# Patient Record
Sex: MALE | Race: WHITE | Employment: FULL TIME
[De-identification: names, ages, dates, MRNs, and addresses within clinical notes are randomized per-mention and may not be internally consistent; named-entity substitution may affect disease eponyms.]

---

## 2017-04-14 PROBLEM — R00.2 PALPITATIONS: Status: ACTIVE | Noted: 2017-04-14

## 2017-06-20 PROCEDURE — 81003 URINALYSIS AUTO W/O SCOPE: CPT | Performed by: INTERNAL MEDICINE

## 2017-06-20 PROCEDURE — 82570 ASSAY OF URINE CREATININE: CPT | Performed by: INTERNAL MEDICINE

## 2017-06-20 PROCEDURE — 82043 UR ALBUMIN QUANTITATIVE: CPT | Performed by: INTERNAL MEDICINE

## 2017-08-24 PROBLEM — I51.9 ASYMPTOMATIC LV DYSFUNCTION: Status: ACTIVE | Noted: 2017-08-24

## 2018-01-18 PROBLEM — H25.813 COMBINED FORMS OF AGE-RELATED CATARACT OF BOTH EYES: Status: ACTIVE | Noted: 2018-01-18

## 2018-01-18 PROBLEM — H43.812 PVD (POSTERIOR VITREOUS DETACHMENT), LEFT: Status: ACTIVE | Noted: 2018-01-18

## 2018-01-18 PROBLEM — E11.9 DIABETES MELLITUS TYPE 2 WITHOUT RETINOPATHY (HCC): Status: ACTIVE | Noted: 2018-01-18

## 2018-03-27 PROCEDURE — 82570 ASSAY OF URINE CREATININE: CPT | Performed by: INTERNAL MEDICINE

## 2018-03-27 PROCEDURE — 82043 UR ALBUMIN QUANTITATIVE: CPT | Performed by: INTERNAL MEDICINE

## 2018-03-27 PROCEDURE — 82607 VITAMIN B-12: CPT | Performed by: INTERNAL MEDICINE

## 2018-03-27 PROCEDURE — 81001 URINALYSIS AUTO W/SCOPE: CPT | Performed by: INTERNAL MEDICINE

## 2018-04-11 PROCEDURE — 88305 TISSUE EXAM BY PATHOLOGIST: CPT | Performed by: INTERNAL MEDICINE

## 2018-04-22 PROBLEM — J33.9 NASAL POLYPS: Status: ACTIVE | Noted: 2018-04-22

## 2018-06-18 PROCEDURE — 82607 VITAMIN B-12: CPT | Performed by: INTERNAL MEDICINE

## 2018-06-28 ENCOUNTER — SURGERY (OUTPATIENT)
Age: 58
End: 2018-06-28

## 2018-06-28 ENCOUNTER — ANESTHESIA EVENT (OUTPATIENT)
Dept: SURGERY | Facility: HOSPITAL | Age: 58
End: 2018-06-28
Payer: COMMERCIAL

## 2018-06-28 ENCOUNTER — ANESTHESIA (OUTPATIENT)
Dept: SURGERY | Facility: HOSPITAL | Age: 58
End: 2018-06-28
Payer: COMMERCIAL

## 2018-06-28 ENCOUNTER — HOSPITAL ENCOUNTER (OUTPATIENT)
Facility: HOSPITAL | Age: 58
Setting detail: HOSPITAL OUTPATIENT SURGERY
Discharge: HOME | End: 2018-06-28
Attending: OTOLARYNGOLOGY | Admitting: OTOLARYNGOLOGY
Payer: COMMERCIAL

## 2018-06-28 VITALS
WEIGHT: 216.81 LBS | DIASTOLIC BLOOD PRESSURE: 83 MMHG | OXYGEN SATURATION: 100 % | RESPIRATION RATE: 20 BRPM | HEIGHT: 73 IN | BODY MASS INDEX: 28.73 KG/M2 | HEART RATE: 76 BPM | SYSTOLIC BLOOD PRESSURE: 123 MMHG | TEMPERATURE: 98 F

## 2018-06-28 DIAGNOSIS — J34.3 HYPERTROPHY OF NASAL TURBINATES: ICD-10-CM

## 2018-06-28 DIAGNOSIS — J32.4 CHRONIC PANSINUSITIS: ICD-10-CM

## 2018-06-28 DIAGNOSIS — J33.9 NOSE POLYP: ICD-10-CM

## 2018-06-28 LAB
GLUCOSE BLD-MCNC: 124 MG/DL (ref 65–99)
GLUCOSE BLD-MCNC: 154 MG/DL (ref 65–99)

## 2018-06-28 PROCEDURE — 099T8ZZ DRAINAGE OF LEFT FRONTAL SINUS, VIA NATURAL OR ARTIFICIAL OPENING ENDOSCOPIC: ICD-10-PCS | Performed by: OTOLARYNGOLOGY

## 2018-06-28 PROCEDURE — 09BR8ZZ EXCISION OF LEFT MAXILLARY SINUS, VIA NATURAL OR ARTIFICIAL OPENING ENDOSCOPIC: ICD-10-PCS | Performed by: OTOLARYNGOLOGY

## 2018-06-28 PROCEDURE — 09TU8ZZ RESECTION OF RIGHT ETHMOID SINUS, VIA NATURAL OR ARTIFICIAL OPENING ENDOSCOPIC: ICD-10-PCS | Performed by: OTOLARYNGOLOGY

## 2018-06-28 PROCEDURE — 88311 DECALCIFY TISSUE: CPT | Performed by: OTOLARYNGOLOGY

## 2018-06-28 PROCEDURE — 09BQ8ZZ EXCISION OF RIGHT MAXILLARY SINUS, VIA NATURAL OR ARTIFICIAL OPENING ENDOSCOPIC: ICD-10-PCS | Performed by: OTOLARYNGOLOGY

## 2018-06-28 PROCEDURE — 8E09XBZ COMPUTER ASSISTED PROCEDURE OF HEAD AND NECK REGION: ICD-10-PCS | Performed by: OTOLARYNGOLOGY

## 2018-06-28 PROCEDURE — 09TV8ZZ RESECTION OF LEFT ETHMOID SINUS, VIA NATURAL OR ARTIFICIAL OPENING ENDOSCOPIC: ICD-10-PCS | Performed by: OTOLARYNGOLOGY

## 2018-06-28 PROCEDURE — 09CW8ZZ EXTIRPATION OF MATTER FROM RIGHT SPHENOID SINUS, VIA NATURAL OR ARTIFICIAL OPENING ENDOSCOPIC: ICD-10-PCS | Performed by: OTOLARYNGOLOGY

## 2018-06-28 PROCEDURE — 82962 GLUCOSE BLOOD TEST: CPT

## 2018-06-28 PROCEDURE — 88304 TISSUE EXAM BY PATHOLOGIST: CPT | Performed by: OTOLARYNGOLOGY

## 2018-06-28 PROCEDURE — 09CX8ZZ EXTIRPATION OF MATTER FROM LEFT SPHENOID SINUS, VIA NATURAL OR ARTIFICIAL OPENING ENDOSCOPIC: ICD-10-PCS | Performed by: OTOLARYNGOLOGY

## 2018-06-28 PROCEDURE — 09BM0ZZ EXCISION OF NASAL SEPTUM, OPEN APPROACH: ICD-10-PCS | Performed by: OTOLARYNGOLOGY

## 2018-06-28 PROCEDURE — 099S8ZZ DRAINAGE OF RIGHT FRONTAL SINUS, VIA NATURAL OR ARTIFICIAL OPENING ENDOSCOPIC: ICD-10-PCS | Performed by: OTOLARYNGOLOGY

## 2018-06-28 RX ORDER — HYDROCODONE BITARTRATE AND ACETAMINOPHEN 5; 325 MG/1; MG/1
1 TABLET ORAL EVERY 4 HOURS PRN
Qty: 30 TABLET | Refills: 0 | Status: SHIPPED | OUTPATIENT
Start: 2018-06-28 | End: 2018-07-08

## 2018-06-28 RX ORDER — HYDROCODONE BITARTRATE AND ACETAMINOPHEN 5; 325 MG/1; MG/1
1 TABLET ORAL AS NEEDED
Status: COMPLETED | OUTPATIENT
Start: 2018-06-28 | End: 2018-06-28

## 2018-06-28 RX ORDER — SODIUM CHLORIDE, SODIUM LACTATE, POTASSIUM CHLORIDE, CALCIUM CHLORIDE 600; 310; 30; 20 MG/100ML; MG/100ML; MG/100ML; MG/100ML
INJECTION, SOLUTION INTRAVENOUS CONTINUOUS
Status: DISCONTINUED | OUTPATIENT
Start: 2018-06-28 | End: 2018-06-28

## 2018-06-28 RX ORDER — MEPERIDINE HYDROCHLORIDE 25 MG/ML
12.5 INJECTION INTRAMUSCULAR; INTRAVENOUS; SUBCUTANEOUS AS NEEDED
Status: DISCONTINUED | OUTPATIENT
Start: 2018-06-28 | End: 2018-06-28

## 2018-06-28 RX ORDER — LIDOCAINE HYDROCHLORIDE AND EPINEPHRINE 10; 10 MG/ML; UG/ML
INJECTION, SOLUTION INFILTRATION; PERINEURAL AS NEEDED
Status: DISCONTINUED | OUTPATIENT
Start: 2018-06-28 | End: 2018-06-28 | Stop reason: HOSPADM

## 2018-06-28 RX ORDER — DEXTROSE MONOHYDRATE 25 G/50ML
50 INJECTION, SOLUTION INTRAVENOUS
Status: DISCONTINUED | OUTPATIENT
Start: 2018-06-28 | End: 2018-06-28 | Stop reason: HOSPADM

## 2018-06-28 RX ORDER — CLINDAMYCIN PHOSPHATE 900 MG/50ML
900 INJECTION INTRAVENOUS ONCE
Status: DISCONTINUED | OUTPATIENT
Start: 2018-06-28 | End: 2018-06-28 | Stop reason: HOSPADM

## 2018-06-28 RX ORDER — HYDROCODONE BITARTRATE AND ACETAMINOPHEN 5; 325 MG/1; MG/1
TABLET ORAL
Status: DISCONTINUED
Start: 2018-06-28 | End: 2018-06-28

## 2018-06-28 RX ORDER — MIDAZOLAM HYDROCHLORIDE 1 MG/ML
1 INJECTION INTRAMUSCULAR; INTRAVENOUS EVERY 5 MIN PRN
Status: DISCONTINUED | OUTPATIENT
Start: 2018-06-28 | End: 2018-06-28

## 2018-06-28 RX ORDER — DEXTROSE MONOHYDRATE 25 G/50ML
50 INJECTION, SOLUTION INTRAVENOUS
Status: DISCONTINUED | OUTPATIENT
Start: 2018-06-28 | End: 2018-06-28

## 2018-06-28 RX ORDER — MORPHINE SULFATE 4 MG/ML
2 INJECTION, SOLUTION INTRAMUSCULAR; INTRAVENOUS EVERY 5 MIN PRN
Status: DISCONTINUED | OUTPATIENT
Start: 2018-06-28 | End: 2018-06-28

## 2018-06-28 RX ORDER — MORPHINE SULFATE 4 MG/ML
INJECTION, SOLUTION INTRAMUSCULAR; INTRAVENOUS
Status: COMPLETED
Start: 2018-06-28 | End: 2018-06-28

## 2018-06-28 RX ORDER — ONDANSETRON 2 MG/ML
4 INJECTION INTRAMUSCULAR; INTRAVENOUS AS NEEDED
Status: DISCONTINUED | OUTPATIENT
Start: 2018-06-28 | End: 2018-06-28

## 2018-06-28 RX ORDER — ACETAMINOPHEN 500 MG
1000 TABLET ORAL ONCE AS NEEDED
Status: DISCONTINUED | OUTPATIENT
Start: 2018-06-28 | End: 2018-06-28

## 2018-06-28 RX ORDER — CETIRIZINE HYDROCHLORIDE 5 MG/1
5 TABLET ORAL AS NEEDED
COMMUNITY
End: 2020-06-03

## 2018-06-28 RX ORDER — ACETAMINOPHEN 500 MG
1000 TABLET ORAL ONCE
Status: ON HOLD | COMMUNITY
End: 2018-06-28

## 2018-06-28 RX ORDER — NALOXONE HYDROCHLORIDE 0.4 MG/ML
80 INJECTION, SOLUTION INTRAMUSCULAR; INTRAVENOUS; SUBCUTANEOUS AS NEEDED
Status: DISCONTINUED | OUTPATIENT
Start: 2018-06-28 | End: 2018-06-28

## 2018-06-28 RX ORDER — HYDROCODONE BITARTRATE AND ACETAMINOPHEN 5; 325 MG/1; MG/1
2 TABLET ORAL AS NEEDED
Status: COMPLETED | OUTPATIENT
Start: 2018-06-28 | End: 2018-06-28

## 2018-06-28 NOTE — H&P
Pre-op Diagnosis: Hypertrophy of nasal turbinates [J34.3]  Chronic pansinusitis [J32.4]  Nose polyp [J33.9]    The H&P by Dr. Marco Torres dated 6/25 was reviewed by Jesse Vazquez MD on 6/28/2018, the patient was examined and no significant changes have occurred i

## 2018-06-28 NOTE — BRIEF OP NOTE
Pre-Operative Diagnosis: Hypertrophy of nasal turbinates [J34.3]  Chronic pansinusitis [J32.4]  Nose polyp [J33.9]     Post-Operative Diagnosis: Hypertrophy of nasal turbinates [J34. 3]Chronic pansinusitis [K88. 4]Nose polyp [J33.9]      Procedure Performed:

## 2018-06-28 NOTE — ANESTHESIA PREPROCEDURE EVALUATION
PRE-OP EVALUATION    Patient Name: Kateryna Mathews    Pre-op Diagnosis: Hypertrophy of nasal turbinates [J34.3]  Chronic pansinusitis [J32.4]  Nose polyp [J33.9]    Procedure(s):  BILATERAL ENDOSCOPIC FRONTAL SINUS EXPLORATION, BILATERAL ENDOSCOPIC TOTAL E Intravenous Continuous   fentaNYL citrate (SUBLIMAZE) 0.05 MG/ML injection 25 mcg 25 mcg Intravenous Q5 Min PRN   morphINE sulfate (PF) 4 MG/ML injection 2 mg 2 mg Intravenous Q5 Min PRN   Atropine Sulfate 0.1 MG/ML injection 0.5 mg 0.5 mg Intravenous PRN famoTIDine 20 MG Oral Tab Take 20 mg by mouth 2 (two) times daily. Disp:  Rfl:        Allergies: Ampicillin; Latex; Penicillins      Anesthesia Evaluation    Patient summary reviewed.     Anesthetic Complications           GI/Hepatic/Renal    Negative GI/ Lab Results  Component Value Date    06/18/2018   K 4.5 06/18/2018    06/18/2018   CO2 24.5 06/18/2018   BUN 12 06/18/2018   CREATSERUM 1.02 06/18/2018    (H) 06/18/2018            Airway      Mallampati: II  Mouth opening: 3 FB  TM

## 2018-06-28 NOTE — ANESTHESIA POSTPROCEDURE EVALUATION
[address] Patient Status:  Hospital Outpatient Surgery   Age/Gender 59year old male MRN [id number]   [facility] SURGERY Attending Sima Schirmer, MD   Hosp Day # 0 PCP Sima Grayson MD       Anesthesia Post-op Note

## 2018-07-09 PROCEDURE — 87086 URINE CULTURE/COLONY COUNT: CPT | Performed by: INTERNAL MEDICINE

## 2019-03-14 PROBLEM — E78.00 PURE HYPERCHOLESTEROLEMIA: Status: ACTIVE | Noted: 2019-03-14

## 2019-07-10 PROCEDURE — 81003 URINALYSIS AUTO W/O SCOPE: CPT | Performed by: INTERNAL MEDICINE

## 2020-04-27 ENCOUNTER — LAB ENCOUNTER (OUTPATIENT)
Dept: LAB | Age: 60
End: 2020-04-27
Attending: DERMATOLOGY
Payer: COMMERCIAL

## 2020-04-27 DIAGNOSIS — S46.811A STRAIN OF RIGHT TRAPEZIUS MUSCLE: Primary | ICD-10-CM

## 2020-04-27 PROCEDURE — 88305 TISSUE EXAM BY PATHOLOGIST: CPT

## 2020-04-30 NOTE — PROGRESS NOTES
Benign pathology results. Good news! No further treatment is necessary unless the lesion regrows or recurs. Ari Garcia MD

## 2020-07-23 ENCOUNTER — HOSPITAL ENCOUNTER (INPATIENT)
Facility: HOSPITAL | Age: 60
LOS: 4 days | Discharge: HOME | DRG: 247 | End: 2020-07-27
Attending: EMERGENCY MEDICINE | Admitting: INTERNAL MEDICINE
Payer: COMMERCIAL

## 2020-07-23 ENCOUNTER — APPOINTMENT (OUTPATIENT)
Dept: INTERVENTIONAL RADIOLOGY/VASCULAR | Facility: HOSPITAL | Age: 60
DRG: 247 | End: 2020-07-23
Attending: INTERNAL MEDICINE
Payer: COMMERCIAL

## 2020-07-23 ENCOUNTER — APPOINTMENT (OUTPATIENT)
Dept: CV DIAGNOSTICS | Facility: HOSPITAL | Age: 60
DRG: 247 | End: 2020-07-23
Attending: INTERNAL MEDICINE
Payer: COMMERCIAL

## 2020-07-23 ENCOUNTER — APPOINTMENT (OUTPATIENT)
Dept: GENERAL RADIOLOGY | Facility: HOSPITAL | Age: 60
DRG: 247 | End: 2020-07-23
Attending: EMERGENCY MEDICINE
Payer: COMMERCIAL

## 2020-07-23 DIAGNOSIS — I21.3 ST ELEVATION MYOCARDIAL INFARCTION (STEMI), UNSPECIFIED ARTERY (HCC): Primary | ICD-10-CM

## 2020-07-23 DIAGNOSIS — I48.91 ATRIAL FIBRILLATION WITH CONTROLLED VENTRICULAR RESPONSE (HCC): ICD-10-CM

## 2020-07-23 DIAGNOSIS — R07.9 CHEST PAIN WITH HIGH RISK FOR CARDIAC ETIOLOGY: ICD-10-CM

## 2020-07-23 LAB
ALBUMIN SERPL-MCNC: 3.9 G/DL (ref 3.4–5)
ALBUMIN/GLOB SERPL: 1.3 {RATIO} (ref 1–2)
ALP LIVER SERPL-CCNC: 71 U/L (ref 45–117)
ALT SERPL-CCNC: 36 U/L (ref 16–61)
ANION GAP SERPL CALC-SCNC: 5 MMOL/L (ref 0–18)
AST SERPL-CCNC: 20 U/L (ref 15–37)
ATRIAL RATE: 81 BPM
ATRIAL RATE: 93 BPM
BASOPHILS # BLD AUTO: 0.13 X10(3) UL (ref 0–0.2)
BASOPHILS NFR BLD AUTO: 1.5 %
BILIRUB SERPL-MCNC: 0.3 MG/DL (ref 0.1–2)
BUN BLD-MCNC: 13 MG/DL (ref 7–18)
BUN/CREAT SERPL: 11.7 (ref 10–20)
CALCIUM BLD-MCNC: 8.9 MG/DL (ref 8.5–10.1)
CHLORIDE SERPL-SCNC: 108 MMOL/L (ref 98–112)
CO2 SERPL-SCNC: 25 MMOL/L (ref 21–32)
CREAT BLD-MCNC: 1.11 MG/DL (ref 0.7–1.3)
DEPRECATED RDW RBC AUTO: 42.3 FL (ref 35.1–46.3)
EOSINOPHIL # BLD AUTO: 0.6 X10(3) UL (ref 0–0.7)
EOSINOPHIL NFR BLD AUTO: 6.9 %
ERYTHROCYTE [DISTWIDTH] IN BLOOD BY AUTOMATED COUNT: 12.2 % (ref 11–15)
GLOBULIN PLAS-MCNC: 2.9 G/DL (ref 2.8–4.4)
GLUCOSE BLD-MCNC: 143 MG/DL (ref 70–99)
GLUCOSE BLD-MCNC: 155 MG/DL (ref 70–99)
GLUCOSE BLD-MCNC: 200 MG/DL (ref 70–99)
GLUCOSE BLD-MCNC: 201 MG/DL (ref 70–99)
GLUCOSE BLD-MCNC: 203 MG/DL (ref 70–99)
GLUCOSE BLD-MCNC: 267 MG/DL (ref 70–99)
HAV IGM SER QL: 2.1 MG/DL (ref 1.6–2.6)
HCT VFR BLD AUTO: 47.2 % (ref 39–53)
HGB BLD-MCNC: 15.7 G/DL (ref 13–17.5)
IMM GRANULOCYTES # BLD AUTO: 0.03 X10(3) UL (ref 0–1)
IMM GRANULOCYTES NFR BLD: 0.3 %
LYMPHOCYTES # BLD AUTO: 3.25 X10(3) UL (ref 1–4)
LYMPHOCYTES NFR BLD AUTO: 37.2 %
M PROTEIN MFR SERPL ELPH: 6.8 G/DL (ref 6.4–8.2)
MCH RBC QN AUTO: 31.2 PG (ref 26–34)
MCHC RBC AUTO-ENTMCNC: 33.3 G/DL (ref 31–37)
MCV RBC AUTO: 93.7 FL (ref 80–100)
MONOCYTES # BLD AUTO: 0.9 X10(3) UL (ref 0.1–1)
MONOCYTES NFR BLD AUTO: 10.3 %
NEUTROPHILS # BLD AUTO: 3.83 X10 (3) UL (ref 1.5–7.7)
NEUTROPHILS # BLD AUTO: 3.83 X10(3) UL (ref 1.5–7.7)
NEUTROPHILS NFR BLD AUTO: 43.8 %
OSMOLALITY SERPL CALC.SUM OF ELEC: 295 MOSM/KG (ref 275–295)
PLATELET # BLD AUTO: 231 10(3)UL (ref 150–450)
POTASSIUM SERPL-SCNC: 3.7 MMOL/L (ref 3.5–5.1)
POTASSIUM SERPL-SCNC: 4.2 MMOL/L (ref 3.5–5.1)
Q-T INTERVAL: 382 MS
Q-T INTERVAL: 408 MS
QRS DURATION: 88 MS
QRS DURATION: 94 MS
QTC CALCULATION (BEZET): 461 MS
QTC CALCULATION (BEZET): 469 MS
R AXIS: -22 DEGREES
R AXIS: -26 DEGREES
RBC # BLD AUTO: 5.04 X10(6)UL (ref 4.3–5.7)
SARS-COV-2 RNA RESP QL NAA+PROBE: NOT DETECTED
SODIUM SERPL-SCNC: 138 MMOL/L (ref 136–145)
T AXIS: -21 DEGREES
T AXIS: 7 DEGREES
TROPONIN I SERPL-MCNC: 0.25 NG/ML (ref ?–0.04)
VENTRICULAR RATE: 77 BPM
VENTRICULAR RATE: 91 BPM
WBC # BLD AUTO: 8.7 X10(3) UL (ref 4–11)

## 2020-07-23 PROCEDURE — 94660 CPAP INITIATION&MGMT: CPT

## 2020-07-23 PROCEDURE — 4A023N7 MEASUREMENT OF CARDIAC SAMPLING AND PRESSURE, LEFT HEART, PERCUTANEOUS APPROACH: ICD-10-PCS | Performed by: INTERNAL MEDICINE

## 2020-07-23 PROCEDURE — 71045 X-RAY EXAM CHEST 1 VIEW: CPT | Performed by: EMERGENCY MEDICINE

## 2020-07-23 PROCEDURE — 80053 COMPREHEN METABOLIC PANEL: CPT | Performed by: EMERGENCY MEDICINE

## 2020-07-23 PROCEDURE — 82962 GLUCOSE BLOOD TEST: CPT

## 2020-07-23 PROCEDURE — 93306 TTE W/DOPPLER COMPLETE: CPT | Performed by: INTERNAL MEDICINE

## 2020-07-23 PROCEDURE — 027034Z DILATION OF CORONARY ARTERY, ONE ARTERY WITH DRUG-ELUTING INTRALUMINAL DEVICE, PERCUTANEOUS APPROACH: ICD-10-PCS | Performed by: INTERNAL MEDICINE

## 2020-07-23 PROCEDURE — 93005 ELECTROCARDIOGRAM TRACING: CPT

## 2020-07-23 PROCEDURE — 93458 L HRT ARTERY/VENTRICLE ANGIO: CPT

## 2020-07-23 PROCEDURE — 99285 EMERGENCY DEPT VISIT HI MDM: CPT

## 2020-07-23 PROCEDURE — B211YZZ FLUOROSCOPY OF MULTIPLE CORONARY ARTERIES USING OTHER CONTRAST: ICD-10-PCS | Performed by: INTERNAL MEDICINE

## 2020-07-23 PROCEDURE — 84132 ASSAY OF SERUM POTASSIUM: CPT | Performed by: INTERNAL MEDICINE

## 2020-07-23 PROCEDURE — 96374 THER/PROPH/DIAG INJ IV PUSH: CPT

## 2020-07-23 PROCEDURE — 85025 COMPLETE CBC W/AUTO DIFF WBC: CPT | Performed by: EMERGENCY MEDICINE

## 2020-07-23 PROCEDURE — 84484 ASSAY OF TROPONIN QUANT: CPT | Performed by: EMERGENCY MEDICINE

## 2020-07-23 PROCEDURE — 83735 ASSAY OF MAGNESIUM: CPT | Performed by: INTERNAL MEDICINE

## 2020-07-23 PROCEDURE — 93010 ELECTROCARDIOGRAM REPORT: CPT | Performed by: INTERNAL MEDICINE

## 2020-07-23 PROCEDURE — 99291 CRITICAL CARE FIRST HOUR: CPT

## 2020-07-23 PROCEDURE — 99153 MOD SED SAME PHYS/QHP EA: CPT

## 2020-07-23 PROCEDURE — 93010 ELECTROCARDIOGRAM REPORT: CPT

## 2020-07-23 PROCEDURE — 99152 MOD SED SAME PHYS/QHP 5/>YRS: CPT

## 2020-07-23 PROCEDURE — B215YZZ FLUOROSCOPY OF LEFT HEART USING OTHER CONTRAST: ICD-10-PCS | Performed by: INTERNAL MEDICINE

## 2020-07-23 RX ORDER — LIDOCAINE HYDROCHLORIDE 10 MG/ML
INJECTION, SOLUTION EPIDURAL; INFILTRATION; INTRACAUDAL; PERINEURAL
Status: COMPLETED
Start: 2020-07-23 | End: 2020-07-23

## 2020-07-23 RX ORDER — LISINOPRIL 5 MG/1
5 TABLET ORAL DAILY
Status: DISCONTINUED | OUTPATIENT
Start: 2020-07-23 | End: 2020-07-27

## 2020-07-23 RX ORDER — TOBRAMYCIN AND DEXAMETHASONE 3; 1 MG/ML; MG/ML
2 SUSPENSION/ DROPS OPHTHALMIC 4 TIMES DAILY
Status: DISCONTINUED | OUTPATIENT
Start: 2020-07-23 | End: 2020-07-27

## 2020-07-23 RX ORDER — NITROGLYCERIN 20 MG/100ML
INJECTION INTRAVENOUS CONTINUOUS
Status: DISCONTINUED | OUTPATIENT
Start: 2020-07-23 | End: 2020-07-26

## 2020-07-23 RX ORDER — ASPIRIN 81 MG/1
81 TABLET ORAL DAILY
Status: DISCONTINUED | OUTPATIENT
Start: 2020-07-24 | End: 2020-07-27

## 2020-07-23 RX ORDER — BIVALIRUDIN 250 MG/5ML
INJECTION, POWDER, LYOPHILIZED, FOR SOLUTION INTRAVENOUS
Status: DISCONTINUED
Start: 2020-07-23 | End: 2020-07-23 | Stop reason: WASHOUT

## 2020-07-23 RX ORDER — ALBUTEROL SULFATE 90 UG/1
2 AEROSOL, METERED RESPIRATORY (INHALATION) EVERY 6 HOURS PRN
Status: DISCONTINUED | OUTPATIENT
Start: 2020-07-23 | End: 2020-07-27

## 2020-07-23 RX ORDER — ACETAMINOPHEN 325 MG/1
650 TABLET ORAL EVERY 4 HOURS PRN
Status: DISCONTINUED | OUTPATIENT
Start: 2020-07-23 | End: 2020-07-27

## 2020-07-23 RX ORDER — HEPARIN SODIUM 5000 [USP'U]/ML
INJECTION, SOLUTION INTRAVENOUS; SUBCUTANEOUS
Status: COMPLETED
Start: 2020-07-23 | End: 2020-07-23

## 2020-07-23 RX ORDER — IPRATROPIUM BROMIDE AND ALBUTEROL SULFATE 2.5; .5 MG/3ML; MG/3ML
3 SOLUTION RESPIRATORY (INHALATION) EVERY 4 HOURS PRN
Status: DISCONTINUED | OUTPATIENT
Start: 2020-07-23 | End: 2020-07-27

## 2020-07-23 RX ORDER — ZOLPIDEM TARTRATE 5 MG/1
5 TABLET ORAL NIGHTLY PRN
Status: DISCONTINUED | OUTPATIENT
Start: 2020-07-23 | End: 2020-07-27

## 2020-07-23 RX ORDER — NITROGLYCERIN 0.4 MG/1
0.4 TABLET SUBLINGUAL ONCE
Status: COMPLETED | OUTPATIENT
Start: 2020-07-23 | End: 2020-07-23

## 2020-07-23 RX ORDER — SODIUM CHLORIDE 9 MG/ML
INJECTION, SOLUTION INTRAVENOUS CONTINUOUS
Status: ACTIVE | OUTPATIENT
Start: 2020-07-23 | End: 2020-07-23

## 2020-07-23 RX ORDER — MIDAZOLAM HYDROCHLORIDE 1 MG/ML
INJECTION INTRAMUSCULAR; INTRAVENOUS
Status: COMPLETED
Start: 2020-07-23 | End: 2020-07-23

## 2020-07-23 RX ORDER — METOPROLOL TARTRATE 5 MG/5ML
5 INJECTION INTRAVENOUS ONCE
Status: COMPLETED | OUTPATIENT
Start: 2020-07-23 | End: 2020-07-23

## 2020-07-23 RX ORDER — ROSUVASTATIN CALCIUM 10 MG/1
10 TABLET, COATED ORAL NIGHTLY
Status: DISCONTINUED | OUTPATIENT
Start: 2020-07-23 | End: 2020-07-23

## 2020-07-23 RX ORDER — MORPHINE SULFATE 4 MG/ML
2 INJECTION, SOLUTION INTRAMUSCULAR; INTRAVENOUS EVERY 6 HOURS PRN
Status: DISCONTINUED | OUTPATIENT
Start: 2020-07-23 | End: 2020-07-27

## 2020-07-23 RX ORDER — LEVOTHYROXINE SODIUM 0.1 MG/1
100 TABLET ORAL
Status: DISCONTINUED | OUTPATIENT
Start: 2020-07-23 | End: 2020-07-27

## 2020-07-23 RX ORDER — NICARDIPINE HYDROCHLORIDE 2.5 MG/ML
INJECTION INTRAVENOUS
Status: COMPLETED
Start: 2020-07-23 | End: 2020-07-23

## 2020-07-23 RX ORDER — ROSUVASTATIN CALCIUM 20 MG/1
20 TABLET, COATED ORAL NIGHTLY
Status: DISCONTINUED | OUTPATIENT
Start: 2020-07-23 | End: 2020-07-27

## 2020-07-23 RX ORDER — MORPHINE SULFATE 4 MG/ML
INJECTION, SOLUTION INTRAMUSCULAR; INTRAVENOUS
Status: COMPLETED
Start: 2020-07-23 | End: 2020-07-23

## 2020-07-23 RX ORDER — FUROSEMIDE 10 MG/ML
40 INJECTION INTRAMUSCULAR; INTRAVENOUS ONCE
Status: COMPLETED | OUTPATIENT
Start: 2020-07-23 | End: 2020-07-23

## 2020-07-23 RX ORDER — PANTOPRAZOLE SODIUM 40 MG/1
40 TABLET, DELAYED RELEASE ORAL
Status: DISCONTINUED | OUTPATIENT
Start: 2020-07-23 | End: 2020-07-27

## 2020-07-23 RX ORDER — NITROGLYCERIN 0.4 MG/1
TABLET SUBLINGUAL
Status: COMPLETED
Start: 2020-07-23 | End: 2020-07-23

## 2020-07-23 RX ORDER — DEXTROSE MONOHYDRATE 25 G/50ML
50 INJECTION, SOLUTION INTRAVENOUS
Status: DISCONTINUED | OUTPATIENT
Start: 2020-07-23 | End: 2020-07-27

## 2020-07-23 RX ORDER — METOPROLOL TARTRATE 5 MG/5ML
INJECTION INTRAVENOUS
Status: COMPLETED
Start: 2020-07-23 | End: 2020-07-23

## 2020-07-23 NOTE — PROCEDURES
[address] [employer] [facility],3Rd Floor Location: Cath Lab    CSN [id number] MRN [id number]   Admission Date 7/23/2020 Procedure Date 7/23/2020   Attending Physician No att. providers found Procedure Physician Dameon Tan MD         CARDIAC CATHETERIZATION/ 102/31 mmHg  Aorta: 101/79/89 mmHg  Left ventriculogram demonstrated a LV ejection fraction of 25-30% without significant mitral regurgitation; severe hypokinesis involving the mid-distal anterior/apical and distal inferior segments, relative sparing of th catheterization: LVEDP 31mmHg, no aortic stenosis. LVEF 25-30% with severe hypokinesis of the mid-distal anterior, distal inferior and apical segments. No significant mitral regurgitation.    2.  Coronary angiography:  right dominant system  - LM: no sign

## 2020-07-23 NOTE — PROGRESS NOTES
07/23/20 1126   Clinical Encounter Type   Visited With Patient   Routine Visit Follow-up   Shinto Encounters   Shinto Needs Prayer  (Per request, prayed and promoted a sense of inner rama and inspiration)   Patient Spiritual Encounters   Spiritual

## 2020-07-23 NOTE — CONSULTS
[facility] Cardiology  Consultation Note      Kristyn Sails Patient Status:  Emergency    1960 MRN [id number]   Location [address] [facility] Attending No att. providers found   [address] Day # 0 PCP Kendall Tejada MD LV gram demonstrated LVEF 25-30% with severe ant/distal inf/apical HK. Cardiology consultation was requested.     Medications:  iodixanol (VISIPAQUE) 320 MG/ML injection 100 mL, 100 mL, Injection, ONCE PRN  furosemide (LASIX) injection 40 mg, 40 mg, Int 10/3/2019    Performed by Ankur Cummins MD at [facility]   • SINUS SURGERY   Bilateral 06/28/2018    FESS/turbinate surgery by Dr Karon Caban.  Ingrid Jacinto   • TONSILLECTOMY         Family History  family history includes Heart Disease in his father; No Known : 222 lb (100.7 kg)      General: Awake and alert; in no acute distress  HEENT: Extraocular movements are intact; sclerae are anicteric; scalp is atrauamatic; no thyromegaly  Neck: Supple; no JVD; no carotid bruits  Cardiac: irregularly irregular, nl rate;

## 2020-07-23 NOTE — DIETARY NOTE
Clinical Nutrition Note    Dietitian consult received per cardiac rehab/CHF protocol. Pt to be educated by cardiac rehab staff and encouraged to attend outpatient classes taught by [employer]. [employer]AMILE[employer] available PRN.     Gerald Broussard RD, [address] [facility]  Pager #[phone]

## 2020-07-23 NOTE — H&P
[facility] Hospitalist History and Physical      CC: chest pain    PCP: Ruben Granados MD    History of Present Illness: Patient is a 56year old male with PMH sig for Afib, DM, HTN here with chest pain.  Symptoms started around 5:30AM with R sided chest disco Rfl: 3  glimepiride 2 MG Oral Tab, Take 0.5 tablets (1 mg total) by mouth 2 (two) times a day.  0.5 tablet (1 mg) BID, Disp: 90 tablet, Rfl: 3  Albuterol Sulfate HFA (PROAIR HFA) 108 (90 Base) MCG/ACT Inhalation Aero Soln, Inhale 2 puffs into the lungs ever **OR** dextrose **OR** glucose **OR** Glucose-Vitamin C    Allergies:    Atorvastatin            MYALGIA  Ampicillin              RASH  Januvia [Sitaglipti*    OTHER (SEE COMMENTS)    Comment:Tachycardia  Naproxen                OTHER (SEE COMMENTS)    Com reviewed. Radiology:    I independently reviewed the following studies from admit date:     CXR:  =====  CONCLUSION:  Mild pulmonary venous congestion.             Assessment/Plan:   Principal Problem:    ST elevation myocardial infarction (STEMI), unspe

## 2020-07-23 NOTE — PROGRESS NOTES
07/23/20 0721   Clinical Encounter Type   Visited With Patient; Health care provider   Routine Visit   (Responded to the code)   Crisis Visit ED    provided compassionate listening presence and support in the midst of the code.    Per patient's re

## 2020-07-23 NOTE — ED INITIAL ASSESSMENT (HPI)
A/O x 4. Patient presents with right sided chest pain that woke him from his sleep. Pain radiates down his right arm. Patient on xarelto. Patient took ASSA 325mg at home. Patient received 1 SL spray and Fentanyl 100mcg by EMS in route.   Patient diaphore

## 2020-07-24 LAB
ANION GAP SERPL CALC-SCNC: 7 MMOL/L (ref 0–18)
ATRIAL RATE: 234 BPM
ATRIAL RATE: 80 BPM
BUN BLD-MCNC: 14 MG/DL (ref 7–18)
BUN/CREAT SERPL: 13.9 (ref 10–20)
CALCIUM BLD-MCNC: 8.4 MG/DL (ref 8.5–10.1)
CHLORIDE SERPL-SCNC: 102 MMOL/L (ref 98–112)
CO2 SERPL-SCNC: 26 MMOL/L (ref 21–32)
CREAT BLD-MCNC: 1.01 MG/DL (ref 0.7–1.3)
DEPRECATED RDW RBC AUTO: 40.2 FL (ref 35.1–46.3)
ERYTHROCYTE [DISTWIDTH] IN BLOOD BY AUTOMATED COUNT: 12.1 % (ref 11–15)
GLUCOSE BLD-MCNC: 155 MG/DL (ref 70–99)
GLUCOSE BLD-MCNC: 159 MG/DL (ref 70–99)
GLUCOSE BLD-MCNC: 188 MG/DL (ref 70–99)
GLUCOSE BLD-MCNC: 194 MG/DL (ref 70–99)
GLUCOSE BLD-MCNC: 214 MG/DL (ref 70–99)
HCT VFR BLD AUTO: 44.9 % (ref 39–53)
HGB BLD-MCNC: 15.3 G/DL (ref 13–17.5)
MCH RBC QN AUTO: 31.1 PG (ref 26–34)
MCHC RBC AUTO-ENTMCNC: 34.1 G/DL (ref 31–37)
MCV RBC AUTO: 91.3 FL (ref 80–100)
OSMOLALITY SERPL CALC.SUM OF ELEC: 286 MOSM/KG (ref 275–295)
PLATELET # BLD AUTO: 181 10(3)UL (ref 150–450)
POTASSIUM SERPL-SCNC: 3.6 MMOL/L (ref 3.5–5.1)
POTASSIUM SERPL-SCNC: 4.5 MMOL/L (ref 3.5–5.1)
Q-T INTERVAL: 368 MS
Q-T INTERVAL: 396 MS
QRS DURATION: 88 MS
QRS DURATION: 90 MS
QTC CALCULATION (BEZET): 522 MS
QTC CALCULATION (BEZET): 540 MS
R AXIS: -47 DEGREES
R AXIS: -52 DEGREES
RBC # BLD AUTO: 4.92 X10(6)UL (ref 4.3–5.7)
SODIUM SERPL-SCNC: 135 MMOL/L (ref 136–145)
T AXIS: 193 DEGREES
T AXIS: 232 DEGREES
VENTRICULAR RATE: 112 BPM
VENTRICULAR RATE: 121 BPM
WBC # BLD AUTO: 11.4 X10(3) UL (ref 4–11)

## 2020-07-24 PROCEDURE — 93010 ELECTROCARDIOGRAM REPORT: CPT | Performed by: INTERNAL MEDICINE

## 2020-07-24 PROCEDURE — 85027 COMPLETE CBC AUTOMATED: CPT | Performed by: INTERNAL MEDICINE

## 2020-07-24 PROCEDURE — 93005 ELECTROCARDIOGRAM TRACING: CPT

## 2020-07-24 PROCEDURE — 82962 GLUCOSE BLOOD TEST: CPT

## 2020-07-24 PROCEDURE — 80048 BASIC METABOLIC PNL TOTAL CA: CPT | Performed by: INTERNAL MEDICINE

## 2020-07-24 PROCEDURE — 84132 ASSAY OF SERUM POTASSIUM: CPT | Performed by: INTERNAL MEDICINE

## 2020-07-24 RX ORDER — METOPROLOL SUCCINATE 25 MG/1
25 TABLET, EXTENDED RELEASE ORAL
Status: DISCONTINUED | OUTPATIENT
Start: 2020-07-24 | End: 2020-07-24

## 2020-07-24 RX ORDER — POTASSIUM CHLORIDE 20 MEQ/1
40 TABLET, EXTENDED RELEASE ORAL EVERY 4 HOURS
Status: COMPLETED | OUTPATIENT
Start: 2020-07-24 | End: 2020-07-24

## 2020-07-24 RX ORDER — CARVEDILOL 3.12 MG/1
3.12 TABLET ORAL 2 TIMES DAILY WITH MEALS
Status: DISCONTINUED | OUTPATIENT
Start: 2020-07-24 | End: 2020-07-24

## 2020-07-24 RX ORDER — METOPROLOL SUCCINATE 25 MG/1
25 TABLET, EXTENDED RELEASE ORAL
Status: DISCONTINUED | OUTPATIENT
Start: 2020-07-24 | End: 2020-07-27

## 2020-07-24 RX ORDER — FUROSEMIDE 10 MG/ML
40 INJECTION INTRAMUSCULAR; INTRAVENOUS ONCE
Status: COMPLETED | OUTPATIENT
Start: 2020-07-24 | End: 2020-07-24

## 2020-07-24 RX ORDER — DILTIAZEM HYDROCHLORIDE 5 MG/ML
10 INJECTION INTRAVENOUS EVERY 2 HOUR PRN
Status: DISCONTINUED | OUTPATIENT
Start: 2020-07-24 | End: 2020-07-27

## 2020-07-24 NOTE — PROGRESS NOTES
[facility] Cardiology  Progress Note    Ian Magi Patient Status:  Inpatient    1960 MRN [id number]   [facility] 6NE-A Attending Hiren Muir MD   Hosp Day # 1 PCP Yo Chen MD     Outpatient Cardiologist: Saulo Villatoro 07/24/20  0655   PGLU 143* 155* 203* 201* 188*       Tele: afib, rates increasing to 100-110.      Imaging:  Xr Chest Ap Portable  (cpt=71045)    Result Date: 7/23/2020  PROCEDURE:  XR CHEST AP PORTABLE  (CPT=71045)  TECHNIQUE:  AP chest radiograph was obta 3. permanent AF  - rate control with BB therapy. He was previously on diltiazem, but BB better for CHF. Hopefully we can avoid having to resume diltiazem. - resume Rivaroxaban today. 4. HL  5. DM2  6.  HTN     Repeat BMP, CBC in AM.     Dispo: trans

## 2020-07-24 NOTE — PROGRESS NOTES
Patient transferred from [facility] 6621. Patient is AOx4, bilateral breath sounds CTA and patient is on room air, A-fib on tele, right groin stable. Patient oriented to room and call light is within reach. Will continue to monitor.

## 2020-07-24 NOTE — RESPIRATORY THERAPY NOTE
OPAL Equipment Usage Summary :            Set Mode :AUTO CPAP W FLEX          Usage in Hours:8;16          90% Pressure (EPAP) : 11           90% Insp Pressure (IPAP);           AHI : 46.5          Supplemental Oxygen LPM :          Auto cpap ( MAX PRESSURE

## 2020-07-24 NOTE — CARDIAC REHAB
Initiated education re: MI/CAD and HF but cut short as pt about to be transferred to 2619. Stent card given as well.

## 2020-07-24 NOTE — PROGRESS NOTES
[facility] Hospitalist Progress Note                                                                   [address]  6/6/1960    CC: FU STEMI     Interval History:  - Doing well overall  - Some lesions. MSK: Normal gait and statin.  No digit cyanosis  Psych: AAO x 3, with appropriate affect        Pertinent Labs:   Recent Labs   Lab 07/23/20  0707 07/24/20  0346   RBC 5.04 4.92   HGB 15.7 15.3   HCT 47.2 44.9   MCV 93.7 91.3   MCH 31.2 31.1   MCH

## 2020-07-24 NOTE — PLAN OF CARE
Received this a.m., awake and alert, denies pain. Rt groin with no signs of hematoma. Ambulated in hallway tolerated well. Afib in monitor. Zoll paperwork in chart.  Tele order received , transferring to room 2620, will be giving report to katy Broussard. Plan of

## 2020-07-24 NOTE — PLAN OF CARE
Patient alert and oriented, denies any chest pain, pressure, or difficulty breathing. Only pain he states he has is the right pectoral area pain he says he has had since he was 19years old, rates it 1/10 on scale.  He does not know origin of pain, states h

## 2020-07-24 NOTE — PLAN OF CARE
Lifevest approved.      Sergei Ashley, lifevest representative aware of possible discharge tomorrow AM. Please call him and let him know if patient will be discharged tomorrow so he can arrange for patient to be fitted prior to leaving the hospital.    Yael Swan

## 2020-07-25 LAB
ANION GAP SERPL CALC-SCNC: 7 MMOL/L (ref 0–18)
BASOPHILS # BLD AUTO: 0.05 X10(3) UL (ref 0–0.2)
BASOPHILS NFR BLD AUTO: 0.4 %
BUN BLD-MCNC: 16 MG/DL (ref 7–18)
BUN/CREAT SERPL: 12.2 (ref 10–20)
CALCIUM BLD-MCNC: 9.1 MG/DL (ref 8.5–10.1)
CHLORIDE SERPL-SCNC: 101 MMOL/L (ref 98–112)
CO2 SERPL-SCNC: 25 MMOL/L (ref 21–32)
CREAT BLD-MCNC: 1.31 MG/DL (ref 0.7–1.3)
DEPRECATED RDW RBC AUTO: 42.1 FL (ref 35.1–46.3)
EOSINOPHIL # BLD AUTO: 0.34 X10(3) UL (ref 0–0.7)
EOSINOPHIL NFR BLD AUTO: 3 %
ERYTHROCYTE [DISTWIDTH] IN BLOOD BY AUTOMATED COUNT: 12.1 % (ref 11–15)
GLUCOSE BLD-MCNC: 167 MG/DL (ref 70–99)
GLUCOSE BLD-MCNC: 177 MG/DL (ref 70–99)
GLUCOSE BLD-MCNC: 191 MG/DL (ref 70–99)
GLUCOSE BLD-MCNC: 199 MG/DL (ref 70–99)
GLUCOSE BLD-MCNC: 209 MG/DL (ref 70–99)
HAV IGM SER QL: 2.4 MG/DL (ref 1.6–2.6)
HCT VFR BLD AUTO: 52.1 % (ref 39–53)
HGB BLD-MCNC: 17.2 G/DL (ref 13–17.5)
IMM GRANULOCYTES # BLD AUTO: 0.05 X10(3) UL (ref 0–1)
IMM GRANULOCYTES NFR BLD: 0.4 %
LYMPHOCYTES # BLD AUTO: 2.46 X10(3) UL (ref 1–4)
LYMPHOCYTES NFR BLD AUTO: 21.4 %
MCH RBC QN AUTO: 31 PG (ref 26–34)
MCHC RBC AUTO-ENTMCNC: 33 G/DL (ref 31–37)
MCV RBC AUTO: 94 FL (ref 80–100)
MONOCYTES # BLD AUTO: 1.44 X10(3) UL (ref 0.1–1)
MONOCYTES NFR BLD AUTO: 12.5 %
NEUTROPHILS # BLD AUTO: 7.17 X10 (3) UL (ref 1.5–7.7)
NEUTROPHILS # BLD AUTO: 7.17 X10(3) UL (ref 1.5–7.7)
NEUTROPHILS NFR BLD AUTO: 62.3 %
OSMOLALITY SERPL CALC.SUM OF ELEC: 283 MOSM/KG (ref 275–295)
PLATELET # BLD AUTO: 229 10(3)UL (ref 150–450)
POTASSIUM SERPL-SCNC: 4.3 MMOL/L (ref 3.5–5.1)
RBC # BLD AUTO: 5.54 X10(6)UL (ref 4.3–5.7)
SODIUM SERPL-SCNC: 133 MMOL/L (ref 136–145)
WBC # BLD AUTO: 11.5 X10(3) UL (ref 4–11)

## 2020-07-25 PROCEDURE — 85025 COMPLETE CBC W/AUTO DIFF WBC: CPT | Performed by: NURSE PRACTITIONER

## 2020-07-25 PROCEDURE — 83735 ASSAY OF MAGNESIUM: CPT | Performed by: HOSPITALIST

## 2020-07-25 PROCEDURE — 80048 BASIC METABOLIC PNL TOTAL CA: CPT | Performed by: NURSE PRACTITIONER

## 2020-07-25 PROCEDURE — 82962 GLUCOSE BLOOD TEST: CPT

## 2020-07-25 RX ORDER — AMIODARONE HYDROCHLORIDE 200 MG/1
400 TABLET ORAL 3 TIMES DAILY
Status: DISCONTINUED | OUTPATIENT
Start: 2020-07-25 | End: 2020-07-27

## 2020-07-25 RX ORDER — FUROSEMIDE 10 MG/ML
20 INJECTION INTRAMUSCULAR; INTRAVENOUS ONCE
Status: COMPLETED | OUTPATIENT
Start: 2020-07-25 | End: 2020-07-25

## 2020-07-25 RX ORDER — CALCIUM CARBONATE 200(500)MG
500 TABLET,CHEWABLE ORAL 3 TIMES DAILY PRN
Status: DISCONTINUED | OUTPATIENT
Start: 2020-07-25 | End: 2020-07-27

## 2020-07-25 NOTE — PLAN OF CARE
7 beats multifocal vtach 23:54 on 7/24, asymptomatic. VSS. Mag added to AM BMP. Call light in reach. Will continue to monitor.

## 2020-07-25 NOTE — PLAN OF CARE
Assumed care of pt around 1930. Pt Aox4. RA. Denies pain. VSS. HR elevated with activity. R sabina antoine, c/d/I. Plan for possible dc tomorrow with life vest. Will continue to monitor.        Problem: CARDIOVASCULAR - ADULT  Goal: Maintains optimal cardiac out

## 2020-07-25 NOTE — CARDIAC REHAB
Cardiac rehab education completed with patient  Stent card given with a copy  Patient referred to cardiac rehab  Cardiac rehab appt made

## 2020-07-25 NOTE — RESPIRATORY THERAPY NOTE
OPAL - Equipment Use Daily Summary:  · Set Mode CFLEX  · Usage in hours: 2:36  · 90% Pressure (EPAP) level: 7.0  · 90% Insp Pressure (IPAP):   · AHI: 54.2  · Supplemental Oxygen:   · Comments:

## 2020-07-25 NOTE — PROGRESS NOTES
[facility] Cardiology  Progress Note    Fabiola Christina Patient Status:  Inpatient    1960 MRN [id number]   [facility] 6NE-A Attending Marylene Rao, MD   Hosp Day # 2 PCP Lorenza Lambert MD     Outpatient Cardiologist: Pernell Prado Lab 07/23/20  2235 07/24/20  0655 07/24/20  1212 07/24/20  1714 07/24/20  2108   PGLU 201* 188* 214* 155* 159*       Tele: afib, rates increasing to 100-110.      Imaging:  Xr Chest Ap Portable  (cpt=71045)    Result Date: 7/23/2020  PROCEDURE:  XR CHEST fair, but likely more tachy in setting of severe LV dysfx. - resumed Rivaroxaban today. 4. HL  5. DM2  6. HTN   7. WCT - may be aberrancy, but likely NSVT    Plan:  toprol increased to 25 mg BID.  amio 400 mg TID.   Lasix 20 mg IVP this am.  Life vest o

## 2020-07-25 NOTE — PLAN OF CARE
Neuro: AOx4  Resp: CTA bilaterally. Room air. Cardiac: A-fib w/RVR w/frequent PVC (V-tach). Amiodarone TID per cardiology. Cardizem 10 mg IVP given once with improved rates. Pedal pulses palpable. No edema. GI: Patient reports small BM today.   : RADU

## 2020-07-25 NOTE — PROGRESS NOTES
[facility] Hospitalist Progress Note                                                                   [address]  6/6/1960    CC: FU STEMI     Interval History:  - Doing well overall  - on RA rashes or lesions. MSK: Normal gait and statin.  No digit cyanosis  Psych: AAO x 3, with appropriate affect        Pertinent Labs:   Recent Labs   Lab 07/23/20  0707 07/24/20  0346 07/25/20  0749   RBC 5.04 4.92 5.54   HGB 15.7 15.3 17.2   HCT 47.2 44.9 52

## 2020-07-26 LAB
ANION GAP SERPL CALC-SCNC: 5 MMOL/L (ref 0–18)
BUN BLD-MCNC: 18 MG/DL (ref 7–18)
BUN/CREAT SERPL: 14 (ref 10–20)
CALCIUM BLD-MCNC: 9.1 MG/DL (ref 8.5–10.1)
CHLORIDE SERPL-SCNC: 102 MMOL/L (ref 98–112)
CO2 SERPL-SCNC: 23 MMOL/L (ref 21–32)
CREAT BLD-MCNC: 1.29 MG/DL (ref 0.7–1.3)
GLUCOSE BLD-MCNC: 156 MG/DL (ref 70–99)
GLUCOSE BLD-MCNC: 191 MG/DL (ref 70–99)
GLUCOSE BLD-MCNC: 199 MG/DL (ref 70–99)
GLUCOSE BLD-MCNC: 221 MG/DL (ref 70–99)
GLUCOSE BLD-MCNC: 233 MG/DL (ref 70–99)
HAV IGM SER QL: 2.3 MG/DL (ref 1.6–2.6)
OSMOLALITY SERPL CALC.SUM OF ELEC: 279 MOSM/KG (ref 275–295)
POTASSIUM SERPL-SCNC: 4.4 MMOL/L (ref 3.5–5.1)
SODIUM SERPL-SCNC: 130 MMOL/L (ref 136–145)

## 2020-07-26 PROCEDURE — 83735 ASSAY OF MAGNESIUM: CPT | Performed by: INTERNAL MEDICINE

## 2020-07-26 PROCEDURE — 80048 BASIC METABOLIC PNL TOTAL CA: CPT | Performed by: INTERNAL MEDICINE

## 2020-07-26 PROCEDURE — 82962 GLUCOSE BLOOD TEST: CPT

## 2020-07-26 RX ORDER — FUROSEMIDE 10 MG/ML
20 INJECTION INTRAMUSCULAR; INTRAVENOUS ONCE
Status: COMPLETED | OUTPATIENT
Start: 2020-07-26 | End: 2020-07-26

## 2020-07-26 RX ORDER — SIMETHICONE 80 MG
80 TABLET,CHEWABLE ORAL 4 TIMES DAILY PRN
Status: DISCONTINUED | OUTPATIENT
Start: 2020-07-26 | End: 2020-07-27

## 2020-07-26 NOTE — PROGRESS NOTES
[facility] Hospitalist Progress Note                                                                   [address]  6/6/1960    CC: FU STEMI     Interval History:    - still w NSVT      Current No digit cyanosis  Psych: AAO x 3, with appropriate affect        Pertinent Labs:   Recent Labs   Lab 07/23/20  0707 07/24/20  0346 07/25/20  0749   RBC 5.04 4.92 5.54   HGB 15.7 15.3 17.2   HCT 47.2 44.9 52.1   MCV 93.7 91.3 94.0   MCH 31.2 31.1 31.0   MC

## 2020-07-26 NOTE — RESPIRATORY THERAPY NOTE
OPAL - Equipment Use Daily Summary:  · Set Mode CFLEX  · Usage in hours: 5:42  · 90% Pressure (EPAP) level: 7.0  · 90% Insp Pressure (IPAP):   · AHI: 42.6  · Supplemental Oxygen:   · Comments:

## 2020-07-26 NOTE — PLAN OF CARE
- See additional Care Plan goals for specific interventions  Outcome: Progressing  Goal: Patient/Family Short Term Goal  Description  Patient's Sh

## 2020-07-26 NOTE — PROGRESS NOTES
[facility] Cardiology  Progress Note    Anais Santiago Patient Status:  Inpatient    1960 MRN [id number]   [facility] 6NE-A Attending Nilda Campa MD   Hosp Day # 3 PCP Mer Holley MD     Outpatient Cardiologist: Thi Landeros 9.1 9.1   MG  --  2.1  --   --  2.4 2.3   *  --  194*  --  199* 221*       Recent Labs   Lab 07/23/20  0706   ALT 36   AST 20   ALB 3.9       Recent Labs   Lab 07/25/20  0722 07/25/20  1125 07/25/20  1612 07/25/20  2120 07/26/20  0700   PGLU 177* 1 orders (faxed). - GDMT for CHF (BB, ACE-I/ARB) Up-titrate in outpatient setting as BP will allow. - toprol at 25 mg BID. 3. permanent AF  - rate control fair, but likely more tachy in setting of severe LV dysfx. - resumed Rivaroxaban. 4. HL  5.

## 2020-07-26 NOTE — PLAN OF CARE
Patient alert and oriented x 4 ,on room air ,denies any cp/ chest discomfort ,vss documented, hr controlled,afib on tele rhythm,o amiodarone administered, cpap tolerating during sleep HS,  no edema noted ,patient voiding good and had normal bm ,rt should maintained within prescribed range  Description  INTERVENTIONS:  - Monitor Blood Glucose as ordered  - Assess for signs and symptoms of hyperglycemia and hypoglycemia  - Administer ordered medications to maintain glucose within target range  - Assess barri

## 2020-07-26 NOTE — PLAN OF CARE
Assumed pt care around 0730. Pt denies CP, SOB, dizziness,or lightheadedness. Pt w/ multiple episodes of NSVT- 6-7 beats, VSS, electrolytes WNL, MD aware- currently on amiodarone. Pt up ad valerie, continent of b/b.  Pt education provided on heart failure, medi management of diabetes  Outcome: Progressing     Problem: METABOLIC/FLUID AND ELECTROLYTES - ADULT  Goal: Glucose maintained within prescribed range  Description  INTERVENTIONS:  - Monitor Blood Glucose as ordered  - Assess for signs and symptoms of hyperg

## 2020-07-27 VITALS
OXYGEN SATURATION: 98 % | HEART RATE: 69 BPM | BODY MASS INDEX: 30.1 KG/M2 | TEMPERATURE: 98 F | RESPIRATION RATE: 18 BRPM | SYSTOLIC BLOOD PRESSURE: 123 MMHG | DIASTOLIC BLOOD PRESSURE: 74 MMHG | HEIGHT: 72 IN | WEIGHT: 222.25 LBS

## 2020-07-27 LAB
GLUCOSE BLD-MCNC: 176 MG/DL (ref 70–99)
GLUCOSE BLD-MCNC: 179 MG/DL (ref 70–99)

## 2020-07-27 PROCEDURE — 82962 GLUCOSE BLOOD TEST: CPT

## 2020-07-27 RX ORDER — ASPIRIN 81 MG/1
81 TABLET ORAL DAILY
Qty: 6 TABLET | Refills: 0 | Status: SHIPPED | COMMUNITY
Start: 2020-07-28 | End: 2020-08-03

## 2020-07-27 RX ORDER — METOPROLOL SUCCINATE 50 MG/1
50 TABLET, EXTENDED RELEASE ORAL
Qty: 60 TABLET | Refills: 11 | Status: SHIPPED | OUTPATIENT
Start: 2020-07-27 | End: 2020-11-21

## 2020-07-27 RX ORDER — METOPROLOL SUCCINATE 50 MG/1
50 TABLET, EXTENDED RELEASE ORAL
Status: DISCONTINUED | OUTPATIENT
Start: 2020-07-27 | End: 2020-07-27

## 2020-07-27 RX ORDER — AMIODARONE HYDROCHLORIDE 200 MG/1
200 TABLET ORAL SEE ADMIN INSTRUCTIONS
Qty: 75 TABLET | Refills: 1 | Status: SHIPPED | OUTPATIENT
Start: 2020-07-27 | End: 2020-08-28

## 2020-07-27 RX ORDER — METOPROLOL SUCCINATE 25 MG/1
25 TABLET, EXTENDED RELEASE ORAL ONCE
Status: COMPLETED | OUTPATIENT
Start: 2020-07-27 | End: 2020-07-27

## 2020-07-27 RX ORDER — LISINOPRIL 10 MG/1
10 TABLET ORAL DAILY
Status: DISCONTINUED | OUTPATIENT
Start: 2020-07-28 | End: 2020-07-27

## 2020-07-27 RX ORDER — SPIRONOLACTONE 25 MG/1
12.5 TABLET ORAL DAILY
Status: DISCONTINUED | OUTPATIENT
Start: 2020-07-27 | End: 2020-07-27

## 2020-07-27 RX ORDER — SPIRONOLACTONE 25 MG/1
12.5 TABLET ORAL DAILY
Qty: 30 TABLET | Refills: 5 | Status: SHIPPED | OUTPATIENT
Start: 2020-07-28 | End: 2020-10-29 | Stop reason: DRUGHIGH

## 2020-07-27 RX ORDER — LISINOPRIL 5 MG/1
5 TABLET ORAL ONCE
Status: DISCONTINUED | OUTPATIENT
Start: 2020-07-27 | End: 2020-07-27

## 2020-07-27 RX ORDER — LISINOPRIL 10 MG/1
10 TABLET ORAL DAILY
Qty: 30 TABLET | Refills: 11 | Status: SHIPPED | OUTPATIENT
Start: 2020-07-28 | End: 2020-08-28

## 2020-07-27 NOTE — RESPIRATORY THERAPY NOTE
OPAL : EQUIPMENT USE: DAILY SUMMARY                                            SET MODE:AUTO CPAP WITH CFLEX                                          USAGE IN HOURS:3:56                                          90%

## 2020-07-27 NOTE — CM/SW NOTE
CM informed patient will need a prior authorization for Ticagelor 90 mg oral tab. CM [facility] [address] to initiate authorization and was informed no prior [address] [facility] [address] is needed. Patient will have a $30 copay for month supply.     Cristiane Lucio, RN, BSN Case Janee Sessions

## 2020-07-27 NOTE — CM/SW NOTE
NADIR spoke with Ignacio Lechuga from [place] # [id number]. A representative will be out to fit patient with life vest today. Ignacio Lechuga to call me with time of fitting once its scheduled.     Sammy Soto RN, BSN   [phone]

## 2020-07-27 NOTE — DISCHARGE SUMMARY
General Medicine Discharge Summary     Patient ID:  Ashu Maier  56year old  6/6/1960    Admit date: 7/23/2020    Discharge date and time:7/27/2020    Attending Physician: Minh Sarah MD     [facility] List    CONTINUE these medications which have NOT CHANGED    tobramycin-dexamethasone 0.3-0.1 % Ophthalmic Suspension  Apply 2 drops to eye 4 (four) times daily. Rivaroxaban (XARELTO) 20 MG Oral Tab  Take 1 tablet (20 mg total) by mouth daily.     Levoth pedal edema   Neuro: CN inact, no focal deficits      Total time coordinating care for discharge: Greater than 30 minutes    . Dorota Pratt  [facility] Hospitalist  [phone]

## 2020-07-27 NOTE — PLAN OF CARE
Pt denies c/o pain, malaise, or cardiac symptoms. A&Ox4. Lungs clear bilaterally with equal expansion, 97% on room air. Pt afib on monitor. Abdomen soft and non-tender with active bowel sounds in all four quadrants. Will continue to monitor.      Problem: C electrolyte imbalances  - Administer electrolyte replacement as ordered  - Monitor response to electrolyte replacements, including rhythm and repeat lab results as appropriate  - Fluid restriction as ordered  - Instruct patient on fluid and nutrition restr

## 2020-07-27 NOTE — PROGRESS NOTES
[facility] Cardiology  Progress Note    Beena Edouard Patient Status:  Inpatient    1960 MRN [id number]   [facility] 6NE-A Attending Daljit Alcantara MD   [address] Day # 4 PCP Adalgisa Alfonso MD     Outpatient Cardiologist: Sherman Elise 2.1  --   --  2.4 2.3   *  --  194*  --  199* 221*       Recent Labs   Lab 07/23/20  0706   ALT 36   AST 20   ALB 3.9       Recent Labs   Lab 07/26/20  0700 07/26/20  1130 07/26/20  1757 07/26/20  2115 07/27/20  0702   PGLU 199* 191* 156* 233* 176* anterior, mid anteroseptal, apical inferior, apical septal, and apical mycardium). - approved for lifevest, fit on discharge.  - GDMT for CHF (BB, ACE-I/ARB. MRA) Up-titrate in outpatient setting as BP will allow. f/u with Byron Mass.     3. NSVT  - per

## 2020-07-27 NOTE — CONSULTS
[facility] Electrophysiology [address] Patient Status:  Inpatient    1960 MRN [id number]   [facility] 2NE-A Attending Minh Sarah MD   Psychiatric Day # 4 PCP MD Ashu Richard is a 56 RASH    Outpatient Medications:  No current facility-administered medications on file prior to encounter. tobramycin-dexamethasone 0.3-0.1 % Ophthalmic Suspension, Apply 2 drops to eye 4 (four) times daily.  (Patient taking differently: Apply 2 d MG Oral Tab, Take 1 tablet (500 mg total) by mouth 3 (three) times daily with meals. , Disp: 270 tablet, Rfl: 3         Scheduled Meds:  • rivaroxaban  20 mg Oral Daily with food   • metoprolol succinate  50 mg Oral 2x Daily(Beta Blocker)   • Metoprolol Suc IRRR; no S3, no S4; no click; no murmur  ABDOMEN: normal active BS, soft, nondistended; nontender  EXTREMITIES: no cyanosis, clubbing or edema, peripheral pulses intact  NEURO: no sensorimotor deficits  PSYCHIATRIC: alert and oriented x 3, affect normal  S

## 2020-08-18 ENCOUNTER — CARDPULM VISIT (OUTPATIENT)
Dept: CARDIAC REHAB | Facility: HOSPITAL | Age: 60
End: 2020-08-18
Attending: INTERNAL MEDICINE
Payer: COMMERCIAL

## 2020-08-18 PROCEDURE — 93798 PHYS/QHP OP CAR RHAB W/ECG: CPT

## 2020-08-20 ENCOUNTER — CARDPULM VISIT (OUTPATIENT)
Dept: CARDIAC REHAB | Facility: HOSPITAL | Age: 60
End: 2020-08-20
Attending: INTERNAL MEDICINE
Payer: COMMERCIAL

## 2020-08-20 PROCEDURE — 93798 PHYS/QHP OP CAR RHAB W/ECG: CPT

## 2020-08-22 ENCOUNTER — APPOINTMENT (OUTPATIENT)
Dept: CARDIAC REHAB | Facility: HOSPITAL | Age: 60
End: 2020-08-22
Attending: INTERNAL MEDICINE
Payer: COMMERCIAL

## 2020-08-22 PROCEDURE — 93798 PHYS/QHP OP CAR RHAB W/ECG: CPT

## 2020-08-25 ENCOUNTER — CARDPULM VISIT (OUTPATIENT)
Dept: CARDIAC REHAB | Facility: HOSPITAL | Age: 60
End: 2020-08-25
Attending: INTERNAL MEDICINE
Payer: COMMERCIAL

## 2020-08-25 PROCEDURE — 93798 PHYS/QHP OP CAR RHAB W/ECG: CPT

## 2020-08-27 ENCOUNTER — CARDPULM VISIT (OUTPATIENT)
Dept: CARDIAC REHAB | Facility: HOSPITAL | Age: 60
End: 2020-08-27
Attending: INTERNAL MEDICINE
Payer: COMMERCIAL

## 2020-08-27 PROCEDURE — 93798 PHYS/QHP OP CAR RHAB W/ECG: CPT

## 2020-08-28 PROBLEM — Z79.01 ANTICOAGULATED: Status: ACTIVE | Noted: 2020-08-28

## 2020-08-28 PROBLEM — I25.5 ISCHEMIC CARDIOMYOPATHY: Status: ACTIVE | Noted: 2020-08-28

## 2020-08-28 PROBLEM — I25.10 CORONARY ARTERY DISEASE INVOLVING NATIVE CORONARY ARTERY OF NATIVE HEART WITHOUT ANGINA PECTORIS: Status: ACTIVE | Noted: 2020-08-28

## 2020-08-28 PROBLEM — Z95.5 S/P PRIMARY ANGIOPLASTY WITH CORONARY STENT: Status: ACTIVE | Noted: 2020-08-28

## 2020-08-29 ENCOUNTER — CARDPULM VISIT (OUTPATIENT)
Dept: CARDIAC REHAB | Facility: HOSPITAL | Age: 60
End: 2020-08-29
Attending: INTERNAL MEDICINE
Payer: COMMERCIAL

## 2020-08-29 PROCEDURE — 93798 PHYS/QHP OP CAR RHAB W/ECG: CPT

## 2020-09-01 ENCOUNTER — CARDPULM VISIT (OUTPATIENT)
Dept: CARDIAC REHAB | Facility: HOSPITAL | Age: 60
End: 2020-09-01
Attending: INTERNAL MEDICINE
Payer: COMMERCIAL

## 2020-09-01 PROCEDURE — 93798 PHYS/QHP OP CAR RHAB W/ECG: CPT

## 2020-09-03 ENCOUNTER — CARDPULM VISIT (OUTPATIENT)
Dept: CARDIAC REHAB | Facility: HOSPITAL | Age: 60
End: 2020-09-03
Attending: INTERNAL MEDICINE
Payer: COMMERCIAL

## 2020-09-03 PROCEDURE — 93798 PHYS/QHP OP CAR RHAB W/ECG: CPT

## 2020-09-05 ENCOUNTER — CARDPULM VISIT (OUTPATIENT)
Dept: CARDIAC REHAB | Facility: HOSPITAL | Age: 60
End: 2020-09-05
Attending: INTERNAL MEDICINE
Payer: COMMERCIAL

## 2020-09-05 PROCEDURE — 93798 PHYS/QHP OP CAR RHAB W/ECG: CPT

## 2020-09-08 ENCOUNTER — CARDPULM VISIT (OUTPATIENT)
Dept: CARDIAC REHAB | Facility: HOSPITAL | Age: 60
End: 2020-09-08
Attending: INTERNAL MEDICINE
Payer: COMMERCIAL

## 2020-09-08 PROCEDURE — 93798 PHYS/QHP OP CAR RHAB W/ECG: CPT

## 2020-09-10 ENCOUNTER — CARDPULM VISIT (OUTPATIENT)
Dept: CARDIAC REHAB | Facility: HOSPITAL | Age: 60
End: 2020-09-10
Attending: INTERNAL MEDICINE
Payer: COMMERCIAL

## 2020-09-10 PROCEDURE — 93798 PHYS/QHP OP CAR RHAB W/ECG: CPT

## 2020-09-12 ENCOUNTER — APPOINTMENT (OUTPATIENT)
Dept: CARDIAC REHAB | Facility: HOSPITAL | Age: 60
End: 2020-09-12
Attending: INTERNAL MEDICINE
Payer: COMMERCIAL

## 2020-09-12 PROCEDURE — 93798 PHYS/QHP OP CAR RHAB W/ECG: CPT

## 2020-09-15 ENCOUNTER — APPOINTMENT (OUTPATIENT)
Dept: CARDIAC REHAB | Facility: HOSPITAL | Age: 60
End: 2020-09-15
Attending: INTERNAL MEDICINE
Payer: COMMERCIAL

## 2020-09-17 ENCOUNTER — APPOINTMENT (OUTPATIENT)
Dept: CARDIAC REHAB | Facility: HOSPITAL | Age: 60
End: 2020-09-17
Attending: INTERNAL MEDICINE
Payer: COMMERCIAL

## 2020-09-19 ENCOUNTER — APPOINTMENT (OUTPATIENT)
Dept: CARDIAC REHAB | Facility: HOSPITAL | Age: 60
End: 2020-09-19
Attending: INTERNAL MEDICINE
Payer: COMMERCIAL

## 2020-09-22 ENCOUNTER — APPOINTMENT (OUTPATIENT)
Dept: CARDIAC REHAB | Facility: HOSPITAL | Age: 60
End: 2020-09-22
Attending: INTERNAL MEDICINE
Payer: COMMERCIAL

## 2020-09-24 ENCOUNTER — APPOINTMENT (OUTPATIENT)
Dept: CARDIAC REHAB | Facility: HOSPITAL | Age: 60
End: 2020-09-24
Attending: INTERNAL MEDICINE
Payer: COMMERCIAL

## 2020-09-26 ENCOUNTER — CARDPULM VISIT (OUTPATIENT)
Dept: CARDIAC REHAB | Facility: HOSPITAL | Age: 60
End: 2020-09-26
Attending: INTERNAL MEDICINE
Payer: COMMERCIAL

## 2020-09-26 PROCEDURE — 93798 PHYS/QHP OP CAR RHAB W/ECG: CPT

## 2020-09-29 ENCOUNTER — CARDPULM VISIT (OUTPATIENT)
Dept: CARDIAC REHAB | Facility: HOSPITAL | Age: 60
End: 2020-09-29
Attending: INTERNAL MEDICINE
Payer: COMMERCIAL

## 2020-09-29 PROCEDURE — 93798 PHYS/QHP OP CAR RHAB W/ECG: CPT

## 2020-10-01 ENCOUNTER — CARDPULM VISIT (OUTPATIENT)
Dept: CARDIAC REHAB | Facility: HOSPITAL | Age: 60
End: 2020-10-01
Attending: INTERNAL MEDICINE
Payer: COMMERCIAL

## 2020-10-01 PROCEDURE — 93798 PHYS/QHP OP CAR RHAB W/ECG: CPT

## 2020-10-03 ENCOUNTER — CARDPULM VISIT (OUTPATIENT)
Dept: CARDIAC REHAB | Facility: HOSPITAL | Age: 60
End: 2020-10-03
Attending: INTERNAL MEDICINE
Payer: COMMERCIAL

## 2020-10-03 PROCEDURE — 93798 PHYS/QHP OP CAR RHAB W/ECG: CPT

## 2020-10-06 ENCOUNTER — CARDPULM VISIT (OUTPATIENT)
Dept: CARDIAC REHAB | Facility: HOSPITAL | Age: 60
End: 2020-10-06
Attending: INTERNAL MEDICINE
Payer: COMMERCIAL

## 2020-10-06 PROCEDURE — 93798 PHYS/QHP OP CAR RHAB W/ECG: CPT

## 2020-10-08 ENCOUNTER — CARDPULM VISIT (OUTPATIENT)
Dept: CARDIAC REHAB | Facility: HOSPITAL | Age: 60
End: 2020-10-08
Attending: INTERNAL MEDICINE
Payer: COMMERCIAL

## 2020-10-08 PROCEDURE — 93798 PHYS/QHP OP CAR RHAB W/ECG: CPT

## 2020-10-10 ENCOUNTER — APPOINTMENT (OUTPATIENT)
Dept: GENERAL RADIOLOGY | Facility: HOSPITAL | Age: 60
End: 2020-10-10
Attending: EMERGENCY MEDICINE
Payer: COMMERCIAL

## 2020-10-10 ENCOUNTER — HOSPITAL ENCOUNTER (EMERGENCY)
Facility: HOSPITAL | Age: 60
Discharge: HOME | End: 2020-10-10
Attending: EMERGENCY MEDICINE
Payer: COMMERCIAL

## 2020-10-10 VITALS
HEART RATE: 55 BPM | BODY MASS INDEX: 27.08 KG/M2 | DIASTOLIC BLOOD PRESSURE: 98 MMHG | WEIGHT: 199.94 LBS | HEIGHT: 72 IN | SYSTOLIC BLOOD PRESSURE: 149 MMHG | TEMPERATURE: 97 F | RESPIRATION RATE: 18 BRPM | OXYGEN SATURATION: 99 %

## 2020-10-10 DIAGNOSIS — M47.22 OSTEOARTHRITIS OF SPINE WITH RADICULOPATHY, CERVICAL REGION: Primary | ICD-10-CM

## 2020-10-10 PROCEDURE — 72050 X-RAY EXAM NECK SPINE 4/5VWS: CPT | Performed by: EMERGENCY MEDICINE

## 2020-10-10 PROCEDURE — 99284 EMERGENCY DEPT VISIT MOD MDM: CPT

## 2020-10-10 NOTE — ED PROVIDER NOTES
Patient Seen in: [facility] Emergency Department      History   Patient presents with:  Numbness Weakness    Stated Complaint: Pt from cardiac rehab for L arm numbness.  Pt also c/o numbness to feet, history*    HPI    Patient is a pleasant 60-year-o COLONOSCOPY, POSSIBLE BIOPSY, POSSIBLE POLYPECTOMY [address] N/A 4/11/2018    Performed by Abhi Jay MD at [facility]   • ESOPHAGOGASTRODUODENOSCOPY, COLONOSCOPY, POSSIBLE BIOPSY, POSSIBLE POLYPECTOMY [address] [facility], [id number] [place] [address] Drive N/A 8/3/2010    Performed Temp 97.4 °F (36.3 °C)   Temp src Temporal   SpO2 93 %   O2 Device None (Room air)       Current:BP (!) 149/98   Pulse 55   Temp 97.4 °F (36.3 °C) (Temporal)   Resp 18   Ht 182.9 cm (6')   Wt 90.7 kg   SpO2 99%   BMI 27.12 kg/m²       Physical Exam    Vi The vertebral body heights and disc heights are preserved. The prevertebral soft tissues within normal limits. No acute displaced osseous fracture or dislocation.             CONCLUSION:  Mild to moderate degenerative changes in the cervical spine with mu

## 2020-10-10 NOTE — ED INITIAL ASSESSMENT (HPI)
Pt sent from cardiac rehab for left arm numbness from elbow down to hand that started this am. Currently the numbness is better now just in hand. Pt denies chest pain, rae or fever. Pt denies n,v,d. Pt has been waking up with sweating during in the night.

## 2020-10-13 ENCOUNTER — CARDPULM VISIT (OUTPATIENT)
Dept: CARDIAC REHAB | Facility: HOSPITAL | Age: 60
End: 2020-10-13
Attending: INTERNAL MEDICINE
Payer: COMMERCIAL

## 2020-10-13 PROCEDURE — 93798 PHYS/QHP OP CAR RHAB W/ECG: CPT

## 2020-10-13 PROCEDURE — 82962 GLUCOSE BLOOD TEST: CPT

## 2020-10-15 ENCOUNTER — CARDPULM VISIT (OUTPATIENT)
Dept: CARDIAC REHAB | Facility: HOSPITAL | Age: 60
End: 2020-10-15
Attending: INTERNAL MEDICINE
Payer: COMMERCIAL

## 2020-10-15 PROCEDURE — 93798 PHYS/QHP OP CAR RHAB W/ECG: CPT

## 2020-10-20 ENCOUNTER — CARDPULM VISIT (OUTPATIENT)
Dept: CARDIAC REHAB | Facility: HOSPITAL | Age: 60
End: 2020-10-20
Attending: INTERNAL MEDICINE
Payer: COMMERCIAL

## 2020-10-20 PROCEDURE — 93798 PHYS/QHP OP CAR RHAB W/ECG: CPT

## 2020-10-21 PROBLEM — I21.3 ST ELEVATION MYOCARDIAL INFARCTION (STEMI), UNSPECIFIED ARTERY (HCC): Status: RESOLVED | Noted: 2020-07-23 | Resolved: 2020-10-21

## 2020-10-21 PROBLEM — R07.9 CHEST PAIN WITH HIGH RISK FOR CARDIAC ETIOLOGY: Status: RESOLVED | Noted: 2020-07-23 | Resolved: 2020-10-21

## 2020-10-21 PROBLEM — H43.812 PVD (POSTERIOR VITREOUS DETACHMENT), LEFT: Status: RESOLVED | Noted: 2018-01-18 | Resolved: 2020-10-21

## 2020-10-22 ENCOUNTER — CARDPULM VISIT (OUTPATIENT)
Dept: CARDIAC REHAB | Facility: HOSPITAL | Age: 60
End: 2020-10-22
Attending: INTERNAL MEDICINE
Payer: COMMERCIAL

## 2020-10-22 PROCEDURE — 93798 PHYS/QHP OP CAR RHAB W/ECG: CPT

## 2020-10-24 ENCOUNTER — CARDPULM VISIT (OUTPATIENT)
Dept: CARDIAC REHAB | Facility: HOSPITAL | Age: 60
End: 2020-10-24
Attending: INTERNAL MEDICINE
Payer: COMMERCIAL

## 2020-10-24 PROCEDURE — 93798 PHYS/QHP OP CAR RHAB W/ECG: CPT

## 2020-10-24 RX ORDER — FUROSEMIDE 40 MG/1
40 TABLET ORAL DAILY
COMMUNITY
End: 2021-03-30 | Stop reason: ALTCHOICE

## 2020-10-27 ENCOUNTER — CARDPULM VISIT (OUTPATIENT)
Dept: CARDIAC REHAB | Facility: HOSPITAL | Age: 60
End: 2020-10-27
Attending: INTERNAL MEDICINE
Payer: COMMERCIAL

## 2020-10-27 PROCEDURE — 93798 PHYS/QHP OP CAR RHAB W/ECG: CPT

## 2020-10-29 ENCOUNTER — CARDPULM VISIT (OUTPATIENT)
Dept: CARDIAC REHAB | Facility: HOSPITAL | Age: 60
End: 2020-10-29
Attending: INTERNAL MEDICINE
Payer: COMMERCIAL

## 2020-10-29 PROCEDURE — 93798 PHYS/QHP OP CAR RHAB W/ECG: CPT

## 2020-10-31 ENCOUNTER — CARDPULM VISIT (OUTPATIENT)
Dept: CARDIAC REHAB | Facility: HOSPITAL | Age: 60
End: 2020-10-31
Attending: INTERNAL MEDICINE
Payer: COMMERCIAL

## 2020-10-31 PROCEDURE — 93798 PHYS/QHP OP CAR RHAB W/ECG: CPT

## 2020-11-03 ENCOUNTER — CARDPULM VISIT (OUTPATIENT)
Dept: CARDIAC REHAB | Facility: HOSPITAL | Age: 60
End: 2020-11-03
Attending: INTERNAL MEDICINE
Payer: COMMERCIAL

## 2020-11-03 PROCEDURE — 93798 PHYS/QHP OP CAR RHAB W/ECG: CPT

## 2020-11-05 ENCOUNTER — CARDPULM VISIT (OUTPATIENT)
Dept: CARDIAC REHAB | Facility: HOSPITAL | Age: 60
End: 2020-11-05
Attending: INTERNAL MEDICINE
Payer: COMMERCIAL

## 2020-11-05 PROCEDURE — 93798 PHYS/QHP OP CAR RHAB W/ECG: CPT

## 2020-11-07 ENCOUNTER — CARDPULM VISIT (OUTPATIENT)
Dept: CARDIAC REHAB | Facility: HOSPITAL | Age: 60
End: 2020-11-07
Attending: INTERNAL MEDICINE
Payer: COMMERCIAL

## 2020-11-07 PROCEDURE — 93798 PHYS/QHP OP CAR RHAB W/ECG: CPT

## 2020-11-10 ENCOUNTER — CARDPULM VISIT (OUTPATIENT)
Dept: CARDIAC REHAB | Facility: HOSPITAL | Age: 60
End: 2020-11-10
Attending: INTERNAL MEDICINE
Payer: COMMERCIAL

## 2020-11-10 PROCEDURE — 93798 PHYS/QHP OP CAR RHAB W/ECG: CPT

## 2020-11-12 ENCOUNTER — CARDPULM VISIT (OUTPATIENT)
Dept: CARDIAC REHAB | Facility: HOSPITAL | Age: 60
End: 2020-11-12
Attending: INTERNAL MEDICINE
Payer: COMMERCIAL

## 2020-11-12 PROCEDURE — 93798 PHYS/QHP OP CAR RHAB W/ECG: CPT

## 2020-11-14 ENCOUNTER — CARDPULM VISIT (OUTPATIENT)
Dept: CARDIAC REHAB | Facility: HOSPITAL | Age: 60
End: 2020-11-14
Attending: INTERNAL MEDICINE
Payer: COMMERCIAL

## 2020-11-14 PROCEDURE — 93798 PHYS/QHP OP CAR RHAB W/ECG: CPT

## 2021-03-18 DIAGNOSIS — Z23 NEED FOR VACCINATION: ICD-10-CM

## 2021-03-20 ENCOUNTER — IMMUNIZATION (OUTPATIENT)
Dept: LAB | Age: 61
End: 2021-03-20
Attending: HOSPITALIST
Payer: COMMERCIAL

## 2021-03-20 DIAGNOSIS — Z23 NEED FOR VACCINATION: Primary | ICD-10-CM

## 2021-03-20 PROCEDURE — 0001A SARSCOV2 VAC 30MCG/0.3ML IM: CPT

## 2021-04-10 ENCOUNTER — IMMUNIZATION (OUTPATIENT)
Dept: LAB | Age: 61
End: 2021-04-10
Attending: HOSPITALIST
Payer: COMMERCIAL

## 2021-04-10 DIAGNOSIS — Z23 NEED FOR VACCINATION: Primary | ICD-10-CM

## 2021-04-10 PROCEDURE — 0002A SARSCOV2 VAC 30MCG/0.3ML IM: CPT

## 2021-07-12 ENCOUNTER — APPOINTMENT (OUTPATIENT)
Dept: GENERAL RADIOLOGY | Facility: HOSPITAL | Age: 61
End: 2021-07-12
Payer: COMMERCIAL

## 2021-07-12 ENCOUNTER — HOSPITAL ENCOUNTER (EMERGENCY)
Facility: HOSPITAL | Age: 61
Discharge: HOME | End: 2021-07-12
Attending: EMERGENCY MEDICINE
Payer: COMMERCIAL

## 2021-07-12 VITALS
OXYGEN SATURATION: 98 % | WEIGHT: 214 LBS | SYSTOLIC BLOOD PRESSURE: 134 MMHG | TEMPERATURE: 98 F | HEART RATE: 46 BPM | BODY MASS INDEX: 28.99 KG/M2 | RESPIRATION RATE: 20 BRPM | DIASTOLIC BLOOD PRESSURE: 80 MMHG | HEIGHT: 72 IN

## 2021-07-12 DIAGNOSIS — R07.89 CHEST PAIN, ATYPICAL: Primary | ICD-10-CM

## 2021-07-12 LAB
ALBUMIN SERPL-MCNC: 4.1 G/DL (ref 3.4–5)
ALBUMIN/GLOB SERPL: 1.5 {RATIO} (ref 1–2)
ALP LIVER SERPL-CCNC: 69 U/L
ALT SERPL-CCNC: 37 U/L
ANION GAP SERPL CALC-SCNC: 8 MMOL/L (ref 0–18)
AST SERPL-CCNC: 17 U/L (ref 15–37)
ATRIAL RATE: 47 BPM
BASOPHILS # BLD AUTO: 0.06 X10(3) UL (ref 0–0.2)
BASOPHILS NFR BLD AUTO: 0.6 %
BILIRUB SERPL-MCNC: 0.5 MG/DL (ref 0.1–2)
BUN BLD-MCNC: 17 MG/DL (ref 7–18)
BUN/CREAT SERPL: 14.3 (ref 10–20)
CALCIUM BLD-MCNC: 9.1 MG/DL (ref 8.5–10.1)
CHLORIDE SERPL-SCNC: 109 MMOL/L (ref 98–112)
CO2 SERPL-SCNC: 25 MMOL/L (ref 21–32)
CREAT BLD-MCNC: 1.19 MG/DL
DEPRECATED RDW RBC AUTO: 43.2 FL (ref 35.1–46.3)
EOSINOPHIL # BLD AUTO: 0.3 X10(3) UL (ref 0–0.7)
EOSINOPHIL NFR BLD AUTO: 2.9 %
ERYTHROCYTE [DISTWIDTH] IN BLOOD BY AUTOMATED COUNT: 12.4 % (ref 11–15)
GLOBULIN PLAS-MCNC: 2.7 G/DL (ref 2.8–4.4)
GLUCOSE BLD-MCNC: 97 MG/DL (ref 70–99)
HCT VFR BLD AUTO: 43.8 %
HGB BLD-MCNC: 14.4 G/DL
IMM GRANULOCYTES # BLD AUTO: 0.03 X10(3) UL (ref 0–1)
IMM GRANULOCYTES NFR BLD: 0.3 %
LYMPHOCYTES # BLD AUTO: 1.62 X10(3) UL (ref 1–4)
LYMPHOCYTES NFR BLD AUTO: 15.9 %
M PROTEIN MFR SERPL ELPH: 6.8 G/DL (ref 6.4–8.2)
MCH RBC QN AUTO: 31.2 PG (ref 26–34)
MCHC RBC AUTO-ENTMCNC: 32.9 G/DL (ref 31–37)
MCV RBC AUTO: 94.8 FL
MONOCYTES # BLD AUTO: 0.85 X10(3) UL (ref 0.1–1)
MONOCYTES NFR BLD AUTO: 8.3 %
NEUTROPHILS # BLD AUTO: 7.32 X10 (3) UL (ref 1.5–7.7)
NEUTROPHILS # BLD AUTO: 7.32 X10(3) UL (ref 1.5–7.7)
NEUTROPHILS NFR BLD AUTO: 72 %
OSMOLALITY SERPL CALC.SUM OF ELEC: 295 MOSM/KG (ref 275–295)
P AXIS: 25 DEGREES
P-R INTERVAL: 190 MS
PLATELET # BLD AUTO: 188 10(3)UL (ref 150–450)
POTASSIUM SERPL-SCNC: 4 MMOL/L (ref 3.5–5.1)
Q-T INTERVAL: 464 MS
QRS DURATION: 98 MS
QTC CALCULATION (BEZET): 410 MS
R AXIS: -32 DEGREES
RBC # BLD AUTO: 4.62 X10(6)UL
SODIUM SERPL-SCNC: 142 MMOL/L (ref 136–145)
T AXIS: -10 DEGREES
TROPONIN I SERPL-MCNC: <0.045 NG/ML (ref ?–0.04)
VENTRICULAR RATE: 47 BPM
WBC # BLD AUTO: 10.2 X10(3) UL (ref 4–11)

## 2021-07-12 PROCEDURE — 99284 EMERGENCY DEPT VISIT MOD MDM: CPT

## 2021-07-12 PROCEDURE — 85025 COMPLETE CBC W/AUTO DIFF WBC: CPT | Performed by: EMERGENCY MEDICINE

## 2021-07-12 PROCEDURE — 71045 X-RAY EXAM CHEST 1 VIEW: CPT | Performed by: EMERGENCY MEDICINE

## 2021-07-12 PROCEDURE — 99285 EMERGENCY DEPT VISIT HI MDM: CPT

## 2021-07-12 PROCEDURE — 96374 THER/PROPH/DIAG INJ IV PUSH: CPT

## 2021-07-12 PROCEDURE — 93010 ELECTROCARDIOGRAM REPORT: CPT

## 2021-07-12 PROCEDURE — 80053 COMPREHEN METABOLIC PANEL: CPT | Performed by: EMERGENCY MEDICINE

## 2021-07-12 PROCEDURE — 93005 ELECTROCARDIOGRAM TRACING: CPT

## 2021-07-12 PROCEDURE — 96361 HYDRATE IV INFUSION ADD-ON: CPT

## 2021-07-12 PROCEDURE — 84484 ASSAY OF TROPONIN QUANT: CPT | Performed by: EMERGENCY MEDICINE

## 2021-07-12 RX ORDER — SODIUM CHLORIDE 9 MG/ML
INJECTION, SOLUTION INTRAVENOUS ONCE
Status: COMPLETED | OUTPATIENT
Start: 2021-07-12 | End: 2021-07-12

## 2021-07-12 RX ORDER — FUROSEMIDE 10 MG/ML
40 INJECTION INTRAMUSCULAR; INTRAVENOUS ONCE
Status: COMPLETED | OUTPATIENT
Start: 2021-07-12 | End: 2021-07-12

## 2021-07-14 NOTE — ED PROVIDER NOTES
Patient Seen in: [facility] Emergency Department      History   Patient presents with:  Chest Pain Angina  Dizziness    Stated Complaint: chest pain    HPI/Subjective:   HPI    33-year-old gentleman presents to the emergency department he states daren is within normal limits pupils are equal round react to light extraocular movements are intact heart has a regular rate and rhythm without murmurs rubs or gallops mildly tender to palpation of the right lateral chest wall/breast tissue lungs are clear to a time.                FINDINGS:    Cardiac silhouette and pulmonary vasculature are unremarkable. No consolidation, pleural effusion or pneumothorax. IMPRESSION:    Unremarkable portable chest radiograph.               Dictated by (CST): Jesus Aj Medication List as of 7/12/2021  6:57 PM

## 2021-10-28 ENCOUNTER — IMMUNIZATION (OUTPATIENT)
Dept: LAB | Facility: HOSPITAL | Age: 61
End: 2021-10-28
Attending: EMERGENCY MEDICINE
Payer: COMMERCIAL

## 2021-10-28 DIAGNOSIS — Z23 NEED FOR VACCINATION: Primary | ICD-10-CM

## 2021-10-28 PROCEDURE — 0004A SARSCOV2 VAC 30MCG/0.3ML IM: CPT

## 2021-11-12 PROBLEM — J31.0 RHINITIS, UNSPECIFIED TYPE: Status: ACTIVE | Noted: 2021-11-12

## 2022-01-25 PROBLEM — S46.011A ROTATOR CUFF STRAIN, RIGHT, INITIAL ENCOUNTER: Status: ACTIVE | Noted: 2022-01-25

## 2022-02-15 ENCOUNTER — HOSPITAL ENCOUNTER (OUTPATIENT)
Dept: INTERVENTIONAL RADIOLOGY/VASCULAR | Facility: HOSPITAL | Age: 62
Discharge: HOME | End: 2022-02-15
Attending: INTERNAL MEDICINE | Admitting: INTERNAL MEDICINE
Payer: COMMERCIAL

## 2022-02-15 VITALS
BODY MASS INDEX: 30.06 KG/M2 | HEIGHT: 70 IN | SYSTOLIC BLOOD PRESSURE: 123 MMHG | OXYGEN SATURATION: 96 % | TEMPERATURE: 97 F | DIASTOLIC BLOOD PRESSURE: 86 MMHG | HEART RATE: 73 BPM | WEIGHT: 210 LBS | RESPIRATION RATE: 19 BRPM

## 2022-02-15 DIAGNOSIS — I25.10 CAD (CORONARY ARTERY DISEASE): ICD-10-CM

## 2022-02-15 DIAGNOSIS — R94.39 ABNORMAL STRESS TEST: ICD-10-CM

## 2022-02-15 LAB — GLUCOSE BLD-MCNC: 113 MG/DL (ref 70–99)

## 2022-02-15 PROCEDURE — 99152 MOD SED SAME PHYS/QHP 5/>YRS: CPT

## 2022-02-15 PROCEDURE — 82962 GLUCOSE BLOOD TEST: CPT

## 2022-02-15 PROCEDURE — 4A023N7 MEASUREMENT OF CARDIAC SAMPLING AND PRESSURE, LEFT HEART, PERCUTANEOUS APPROACH: ICD-10-PCS | Performed by: INTERNAL MEDICINE

## 2022-02-15 PROCEDURE — 99153 MOD SED SAME PHYS/QHP EA: CPT

## 2022-02-15 PROCEDURE — 93458 L HRT ARTERY/VENTRICLE ANGIO: CPT

## 2022-02-15 PROCEDURE — B2111ZZ FLUOROSCOPY OF MULTIPLE CORONARY ARTERIES USING LOW OSMOLAR CONTRAST: ICD-10-PCS | Performed by: INTERNAL MEDICINE

## 2022-02-15 PROCEDURE — B2151ZZ FLUOROSCOPY OF LEFT HEART USING LOW OSMOLAR CONTRAST: ICD-10-PCS | Performed by: INTERNAL MEDICINE

## 2022-02-15 RX ORDER — MIDAZOLAM HYDROCHLORIDE 1 MG/ML
INJECTION INTRAMUSCULAR; INTRAVENOUS
Status: COMPLETED
Start: 2022-02-15 | End: 2022-02-15

## 2022-02-15 RX ORDER — HEPARIN SODIUM 5000 [USP'U]/ML
INJECTION, SOLUTION INTRAVENOUS; SUBCUTANEOUS
Status: COMPLETED
Start: 2022-02-15 | End: 2022-02-15

## 2022-02-15 RX ORDER — DIPHENHYDRAMINE HYDROCHLORIDE 50 MG/ML
INJECTION INTRAMUSCULAR; INTRAVENOUS
Status: COMPLETED
Start: 2022-02-15 | End: 2022-02-15

## 2022-02-15 RX ORDER — LIDOCAINE HYDROCHLORIDE 10 MG/ML
INJECTION, SOLUTION EPIDURAL; INFILTRATION; INTRACAUDAL; PERINEURAL
Status: COMPLETED
Start: 2022-02-15 | End: 2022-02-15

## 2022-02-15 RX ORDER — SODIUM CHLORIDE 9 MG/ML
INJECTION, SOLUTION INTRAVENOUS
Status: COMPLETED | OUTPATIENT
Start: 2022-02-16 | End: 2022-02-15

## 2022-02-15 RX ORDER — NITROGLYCERIN 20 MG/100ML
INJECTION INTRAVENOUS
Status: COMPLETED
Start: 2022-02-15 | End: 2022-02-15

## 2022-02-15 RX ORDER — VERAPAMIL HYDROCHLORIDE 2.5 MG/ML
INJECTION, SOLUTION INTRAVENOUS
Status: COMPLETED
Start: 2022-02-15 | End: 2022-02-15

## 2022-02-15 RX ADMIN — SODIUM CHLORIDE: 9 INJECTION, SOLUTION INTRAVENOUS at 13:55:00

## 2022-02-15 NOTE — PROCEDURES
[address] Location: Cath Lab    CSN [id number] MRN [id number]   Admission Date 2/15/2022 Procedure Date 2/15/2022   Attending Physician Roman Gramajo MD Procedure Physician Krupa Cunningham MD         CARDIAC CATHETERIZATION REPORT     PREOPERATIVE DIAGNOSIS:  CAD s/p ant STEMI 2020, exertional dyspnea, abnormal stress test- inferolateral reversible defect  POSTOPERATIVE DIAGNOSIS:  same as above. PROCEDURE PERFORMED:  left heart catheterization, left ventriculogram, selective coronary angiography      PROCEDURE:  The patient was brought to the cardiac catheterization lab in the fasting state. Informed consent was obtained. Moderate sedation was employed using a total of IV Versed 4mg and IV fentanyl 100mcg. I directly observed the patient from [phone] to [phone], for a total of 31 minutes, and an independent trained observer was present and assisted in the monitoring of the patient's level of consciousness and physiological status, watching the heart rate, blood pressure, oximetry, and rhythm, in addition to total moderation time. ACCESS/CATHETER PLACEMENT:   The right radial area was prepped and draped in a sterile manner and anesthetized with 2% lidocaine. The right radial artery was accessed, and a 6-Greenlandic, 11 cm sheath was placed. Left and right selective coronary angiography was performed using a 6F Tiger4. 0 catheter. A pigtail catheter was used to cross the aortic valve, measure left ventricular pressure and perform a 30 degree PEREZ ventriculogram.  The catheter was pulled across the valve to assess for aortic stenosis. At the conclusion of the study, the right radial artery hemostasis was performed using a radial band. FINDINGS:      1.   Left heart catheterization:    Left ventricle: 109/8 mmHg  Aorta: 103/61/80 mmHg  Left ventriculogram demonstrated a LV ejection fraction of 55-60% without significant mitral regurgitation; there are no regional wall motion abnormalities. There was no aortic stenosis upon pullback of the catheter. 2.  Selective coronary angiography:      Left main artery: The left main artery is a medium caliber, bifurcating vessel without significant angiographic disease. LAD:  The left anterior descending artery is a medium caliber vessel that wraps around the apex and gives rise to a medium diagonal branch. The proximal stent is widely patent without significant neointimal hyperplasia. Moderate diffuse disease of the mid-distal LAD is present. LCx: The left circumflex artery is a medium caliber vessel that gives rise to two small-medium mid obtuse marginals and two small distal left PL branches. The first OM branch is small caliber and demonstrates 80% proximal diffuse disease. The 2nd OM also demonstrated proximal disease, moderate diffuse 50%. RCA:  The right coronary artery is small caliber, dominant vessel that gives rise to a small-medium rPDA. 50% proximal focal stenosis of the rPDA is present. MEDICATIONS:  See nursing record. COMPLICATIONS:  No major complications were observed during this visit to the catheterization lab. IMPRESSION:    1. Left heart catheterization: LVEDP 8mmHg, no aortic stenosis. LVEF 55-60% with normal wall motion, no mitral regurgitation  2. Coronary angiography:  right dominant system  - LM: no significant angiographic disease  - LAD: patent proximal stent  - LCx: 80% proximal OM1 stenosis (small caliber), 50% OM2 stenosis  - RCA: 50% rPDA stenosis     RECOMMENDATIONS: Small vessel, sidebranch disease is obstructive (OM1), but unchanged in angiographic appearance from prior study. Unlikely to explain new onset dyspnea on exertion; in the absence of exertional chest pain, will continue to manage small vessel disease medically.

## 2022-02-16 NOTE — PLAN OF CARE
Pt post LHC via right radial approach. TR band in place on arrival back to unit. CMS intact to right hand. Adequate O2 pleth also noted to right hand. VSS. After approx 1hr, air slowly released from TR band and removed. Site intact. Pressure dressing placed. Discharge instructions explained to pt, verbalized understanding. Pt taken via wheelchair to [facility] entrance w/ all belongings on discharge. Pt's friend is .

## 2023-11-29 RX ORDER — CLOPIDOGREL BISULFATE 75 MG/1
75 TABLET ORAL DAILY
COMMUNITY

## 2023-11-29 RX ORDER — INSULIN GLARGINE 100 [IU]/ML
18 INJECTION, SOLUTION SUBCUTANEOUS NIGHTLY
COMMUNITY

## 2023-11-29 RX ORDER — FUROSEMIDE 20 MG/1
20 TABLET ORAL 2 TIMES DAILY
COMMUNITY

## 2023-11-29 RX ORDER — GABAPENTIN 300 MG/1
300 CAPSULE ORAL 3 TIMES DAILY
COMMUNITY

## 2023-11-29 RX ORDER — ROSUVASTATIN CALCIUM 10 MG/1
10 TABLET, COATED ORAL NIGHTLY
COMMUNITY

## 2023-11-29 RX ORDER — LORATADINE 10 MG/1
10 TABLET ORAL DAILY
COMMUNITY

## 2023-11-29 RX ORDER — METOPROLOL TARTRATE 50 MG/1
50 TABLET, FILM COATED ORAL DAILY
COMMUNITY

## 2023-12-07 ENCOUNTER — ANESTHESIA EVENT (OUTPATIENT)
Dept: SURGERY | Facility: HOSPITAL | Age: 63
End: 2023-12-07
Payer: MEDICARE

## 2023-12-08 RX ORDER — VANCOMYCIN HYDROCHLORIDE
15 ONCE
Status: DISCONTINUED | OUTPATIENT
Start: 2023-12-11 | End: 2023-12-08

## 2023-12-11 ENCOUNTER — HOSPITAL ENCOUNTER (OUTPATIENT)
Facility: HOSPITAL | Age: 63
Setting detail: HOSPITAL OUTPATIENT SURGERY
Discharge: HOME | End: 2023-12-11
Attending: SURGERY | Admitting: SURGERY
Payer: MEDICARE

## 2023-12-11 ENCOUNTER — ANESTHESIA (OUTPATIENT)
Dept: SURGERY | Facility: HOSPITAL | Age: 63
End: 2023-12-11
Payer: MEDICARE

## 2023-12-11 VITALS
WEIGHT: 215 LBS | HEIGHT: 73 IN | DIASTOLIC BLOOD PRESSURE: 91 MMHG | OXYGEN SATURATION: 98 % | HEART RATE: 71 BPM | TEMPERATURE: 97 F | SYSTOLIC BLOOD PRESSURE: 133 MMHG | RESPIRATION RATE: 18 BRPM | BODY MASS INDEX: 28.49 KG/M2

## 2023-12-11 LAB
GLUCOSE BLD-MCNC: 137 MG/DL (ref 70–99)
GLUCOSE BLD-MCNC: 142 MG/DL (ref 70–99)

## 2023-12-11 PROCEDURE — 8E0W4CZ ROBOTIC ASSISTED PROCEDURE OF TRUNK REGION, PERCUTANEOUS ENDOSCOPIC APPROACH: ICD-10-PCS | Performed by: SURGERY

## 2023-12-11 PROCEDURE — 82962 GLUCOSE BLOOD TEST: CPT

## 2023-12-11 PROCEDURE — 0YUA4JZ SUPPLEMENT BILATERAL INGUINAL REGION WITH SYNTHETIC SUBSTITUTE, PERCUTANEOUS ENDOSCOPIC APPROACH: ICD-10-PCS | Performed by: SURGERY

## 2023-12-11 DEVICE — LAPAROSCOPIC SELF-FIXATING MESH POLYESTER WITH POLYLACTIC ACID GRIPS AND COLLAGEN FILM
Type: IMPLANTABLE DEVICE | Site: GROIN | Status: FUNCTIONAL
Brand: PROGRIP

## 2023-12-11 RX ORDER — ONDANSETRON 2 MG/ML
4 INJECTION INTRAMUSCULAR; INTRAVENOUS EVERY 6 HOURS PRN
Status: DISCONTINUED | OUTPATIENT
Start: 2023-12-11 | End: 2023-12-11

## 2023-12-11 RX ORDER — KETOROLAC TROMETHAMINE 30 MG/ML
INJECTION, SOLUTION INTRAMUSCULAR; INTRAVENOUS AS NEEDED
Status: DISCONTINUED | OUTPATIENT
Start: 2023-12-11 | End: 2023-12-11 | Stop reason: SURG

## 2023-12-11 RX ORDER — HEPARIN SODIUM 5000 [USP'U]/ML
5000 INJECTION, SOLUTION INTRAVENOUS; SUBCUTANEOUS ONCE
Status: COMPLETED | OUTPATIENT
Start: 2023-12-11 | End: 2023-12-11

## 2023-12-11 RX ORDER — NICOTINE POLACRILEX 4 MG
15 LOZENGE BUCCAL
Status: DISCONTINUED | OUTPATIENT
Start: 2023-12-11 | End: 2023-12-11 | Stop reason: HOSPADM

## 2023-12-11 RX ORDER — SODIUM CHLORIDE, SODIUM LACTATE, POTASSIUM CHLORIDE, CALCIUM CHLORIDE 600; 310; 30; 20 MG/100ML; MG/100ML; MG/100ML; MG/100ML
INJECTION, SOLUTION INTRAVENOUS CONTINUOUS
Status: DISCONTINUED | OUTPATIENT
Start: 2023-12-11 | End: 2023-12-11

## 2023-12-11 RX ORDER — LIDOCAINE HYDROCHLORIDE 10 MG/ML
INJECTION, SOLUTION EPIDURAL; INFILTRATION; INTRACAUDAL; PERINEURAL AS NEEDED
Status: DISCONTINUED | OUTPATIENT
Start: 2023-12-11 | End: 2023-12-11 | Stop reason: SURG

## 2023-12-11 RX ORDER — CEFAZOLIN SODIUM/WATER 2 G/20 ML
2 SYRINGE (ML) INTRAVENOUS ONCE
Status: COMPLETED | OUTPATIENT
Start: 2023-12-11 | End: 2023-12-11

## 2023-12-11 RX ORDER — MEPERIDINE HYDROCHLORIDE 25 MG/ML
INJECTION INTRAMUSCULAR; INTRAVENOUS; SUBCUTANEOUS
Status: COMPLETED
Start: 2023-12-11 | End: 2023-12-11

## 2023-12-11 RX ORDER — NICOTINE POLACRILEX 4 MG
30 LOZENGE BUCCAL
Status: DISCONTINUED | OUTPATIENT
Start: 2023-12-11 | End: 2023-12-11 | Stop reason: HOSPADM

## 2023-12-11 RX ORDER — METOCLOPRAMIDE HYDROCHLORIDE 5 MG/ML
INJECTION INTRAMUSCULAR; INTRAVENOUS AS NEEDED
Status: DISCONTINUED | OUTPATIENT
Start: 2023-12-11 | End: 2023-12-11 | Stop reason: SURG

## 2023-12-11 RX ORDER — MIDAZOLAM HYDROCHLORIDE 1 MG/ML
1 INJECTION INTRAMUSCULAR; INTRAVENOUS EVERY 5 MIN PRN
Status: DISCONTINUED | OUTPATIENT
Start: 2023-12-11 | End: 2023-12-11

## 2023-12-11 RX ORDER — ROCURONIUM BROMIDE 10 MG/ML
INJECTION, SOLUTION INTRAVENOUS AS NEEDED
Status: DISCONTINUED | OUTPATIENT
Start: 2023-12-11 | End: 2023-12-11 | Stop reason: SURG

## 2023-12-11 RX ORDER — HYDROCODONE BITARTRATE AND ACETAMINOPHEN 5; 325 MG/1; MG/1
1 TABLET ORAL ONCE AS NEEDED
Status: COMPLETED | OUTPATIENT
Start: 2023-12-11 | End: 2023-12-11

## 2023-12-11 RX ORDER — BUPIVACAINE HYDROCHLORIDE AND EPINEPHRINE 5; 5 MG/ML; UG/ML
INJECTION, SOLUTION EPIDURAL; INTRACAUDAL; PERINEURAL AS NEEDED
Status: DISCONTINUED | OUTPATIENT
Start: 2023-12-11 | End: 2023-12-11

## 2023-12-11 RX ORDER — HEPARIN SODIUM 5000 [USP'U]/ML
INJECTION, SOLUTION INTRAVENOUS; SUBCUTANEOUS
Status: COMPLETED
Start: 2023-12-11 | End: 2023-12-11

## 2023-12-11 RX ORDER — ACETAMINOPHEN 500 MG
1000 TABLET ORAL ONCE AS NEEDED
Status: COMPLETED | OUTPATIENT
Start: 2023-12-11 | End: 2023-12-11

## 2023-12-11 RX ORDER — HYDROCODONE BITARTRATE AND ACETAMINOPHEN 5; 325 MG/1; MG/1
2 TABLET ORAL ONCE AS NEEDED
Status: COMPLETED | OUTPATIENT
Start: 2023-12-11 | End: 2023-12-11

## 2023-12-11 RX ORDER — ACETAMINOPHEN 500 MG
1000 TABLET ORAL ONCE
Status: DISCONTINUED | OUTPATIENT
Start: 2023-12-11 | End: 2023-12-11 | Stop reason: HOSPADM

## 2023-12-11 RX ORDER — HYDROMORPHONE HYDROCHLORIDE 1 MG/ML
0.2 INJECTION, SOLUTION INTRAMUSCULAR; INTRAVENOUS; SUBCUTANEOUS EVERY 5 MIN PRN
Status: DISCONTINUED | OUTPATIENT
Start: 2023-12-11 | End: 2023-12-11

## 2023-12-11 RX ORDER — OXYCODONE HYDROCHLORIDE AND ACETAMINOPHEN 5; 325 MG/1; MG/1
1 TABLET ORAL EVERY 4 HOURS PRN
Qty: 30 TABLET | Refills: 0 | Status: SHIPPED | OUTPATIENT
Start: 2023-12-11 | End: 2023-12-21

## 2023-12-11 RX ORDER — HYDROMORPHONE HYDROCHLORIDE 1 MG/ML
INJECTION, SOLUTION INTRAMUSCULAR; INTRAVENOUS; SUBCUTANEOUS
Status: COMPLETED
Start: 2023-12-11 | End: 2023-12-11

## 2023-12-11 RX ORDER — HYDROMORPHONE HYDROCHLORIDE 1 MG/ML
0.6 INJECTION, SOLUTION INTRAMUSCULAR; INTRAVENOUS; SUBCUTANEOUS EVERY 5 MIN PRN
Status: DISCONTINUED | OUTPATIENT
Start: 2023-12-11 | End: 2023-12-11

## 2023-12-11 RX ORDER — CEFAZOLIN SODIUM/WATER 2 G/20 ML
SYRINGE (ML) INTRAVENOUS
Status: DISCONTINUED
Start: 2023-12-11 | End: 2023-12-11

## 2023-12-11 RX ORDER — ONDANSETRON 2 MG/ML
INJECTION INTRAMUSCULAR; INTRAVENOUS AS NEEDED
Status: DISCONTINUED | OUTPATIENT
Start: 2023-12-11 | End: 2023-12-11 | Stop reason: SURG

## 2023-12-11 RX ORDER — DEXTROSE MONOHYDRATE 25 G/50ML
50 INJECTION, SOLUTION INTRAVENOUS
Status: DISCONTINUED | OUTPATIENT
Start: 2023-12-11 | End: 2023-12-11 | Stop reason: HOSPADM

## 2023-12-11 RX ORDER — NALOXONE HYDROCHLORIDE 0.4 MG/ML
0.08 INJECTION, SOLUTION INTRAMUSCULAR; INTRAVENOUS; SUBCUTANEOUS AS NEEDED
Status: DISCONTINUED | OUTPATIENT
Start: 2023-12-11 | End: 2023-12-11

## 2023-12-11 RX ORDER — HYDROMORPHONE HYDROCHLORIDE 1 MG/ML
0.4 INJECTION, SOLUTION INTRAMUSCULAR; INTRAVENOUS; SUBCUTANEOUS EVERY 5 MIN PRN
Status: DISCONTINUED | OUTPATIENT
Start: 2023-12-11 | End: 2023-12-11

## 2023-12-11 RX ORDER — MEPERIDINE HYDROCHLORIDE 25 MG/ML
25 INJECTION INTRAMUSCULAR; INTRAVENOUS; SUBCUTANEOUS
Status: DISCONTINUED | OUTPATIENT
Start: 2023-12-11 | End: 2023-12-11

## 2023-12-11 RX ORDER — SCOLOPAMINE TRANSDERMAL SYSTEM 1 MG/1
1 PATCH, EXTENDED RELEASE TRANSDERMAL ONCE
Status: DISCONTINUED | OUTPATIENT
Start: 2023-12-11 | End: 2023-12-11 | Stop reason: HOSPADM

## 2023-12-11 RX ORDER — MIDAZOLAM HYDROCHLORIDE 1 MG/ML
INJECTION INTRAMUSCULAR; INTRAVENOUS AS NEEDED
Status: DISCONTINUED | OUTPATIENT
Start: 2023-12-11 | End: 2023-12-11 | Stop reason: SURG

## 2023-12-11 RX ADMIN — ONDANSETRON 4 MG: 2 INJECTION INTRAMUSCULAR; INTRAVENOUS at 16:45:00

## 2023-12-11 RX ADMIN — KETOROLAC TROMETHAMINE 30 MG: 30 INJECTION, SOLUTION INTRAMUSCULAR; INTRAVENOUS at 16:45:00

## 2023-12-11 RX ADMIN — CEFAZOLIN SODIUM/WATER 2 G: 2 G/20 ML SYRINGE (ML) INTRAVENOUS at 15:40:00

## 2023-12-11 RX ADMIN — MIDAZOLAM HYDROCHLORIDE 2 MG: 1 INJECTION INTRAMUSCULAR; INTRAVENOUS at 15:39:00

## 2023-12-11 RX ADMIN — LIDOCAINE HYDROCHLORIDE 10 MG: 10 INJECTION, SOLUTION EPIDURAL; INFILTRATION; INTRACAUDAL; PERINEURAL at 15:44:00

## 2023-12-11 RX ADMIN — METOCLOPRAMIDE HYDROCHLORIDE 10 MG: 5 INJECTION INTRAMUSCULAR; INTRAVENOUS at 15:50:00

## 2023-12-11 RX ADMIN — ROCURONIUM BROMIDE 50 MG: 10 INJECTION, SOLUTION INTRAVENOUS at 15:50:00

## 2023-12-11 NOTE — OPERATIVE REPORT
Report of T [address] Patient Status:  Hospital Outpatient Surgery    1960 MRN [id number]   [facility] SURGERY Attending Sumit Mauro MD   [facility] Day # 0 PCP Conrad Roman MD       2023    Alric Officer    PRE-OPERATIVE DIAGNOSIS:                     bilateral Inguinal Hernia    POST-OPERATIVE DIAGNOSIS:                    Same    PROCEDURE:                                                Robotic-assisted bilateral Inguinal Hernia Repair     SURGEON:                                                    Rupesh Chandra MD    ASSISTANT:                                                  Ashley Soto sa    A surgical assistant was medically necessary and needed for the entire procedure to help with positioning, prepping, instrument passing and holding, opening, closing, and suturing. ANESTHESIA:                                                General    EBL:                                                               5cc  Procedure: The patient was taken to the operating suite after informed consent and proper identification, administered a general anesthetic. The patient's abdomen was prepped and draped in the usual sterile fashion. An Umbilical incision was performed with a #11 scalpel. Veress needle was introduced in the peritoneal cavity. Pneumoperitoneum was created. An 8 mm trocar was introduced through the Umbilical incision and a robotic scope was introduced. An 8 mm trocar was placed in the right abdomen; another 8 mm trocar was placed in the left abdomen. The Dickson's was docked to the patient. Operating surgeon went below to the operating console. Patient had a bilateral inguinal hernia. Peritoneum was incised in the bilateral inguinal region. Preperitoneal space was developed sharply and bluntly. Hernia sacs were dissected off of the cord structures.   Once this space was developed, a ProGrip mesh was placed over the hernia defects with wide overlay. Once this was secured, the peritoneum was closed with an interlocking 2-0 V-Loc suture. The pneumoperitoneum was then decompressed and all ports were removed. Skin incisions were closed with 4-0 Vicryl in a subcuticular fashion. The wounds were infiltrated with local anesthesia, Dermabond applied directly to the incision surface. The patient tolerated the procedure well.     Fidelina Prakash MD  12/11/2023

## 2023-12-11 NOTE — ANESTHESIA POSTPROCEDURE EVALUATION
[address] Patient Status:  Hospital Outpatient Surgery   Age/Gender 70year old male MRN [id number]   Location [facility] Attending Cosmo Cornell MD   Hosp Day # 0 PCP Magdiel Ferrara MD       Anesthesia Post-op Note    ROBOTIC ASSISTED REPAIR OF BILATERAL INGUINAL HERNIA WITH MESH    Procedure Summary       Date: 12/11/23 Room / Location: 1404 [facility] OR 08 / 140[facility] OR    Anesthesia Start: 8393 Anesthesia Stop: 1700    Procedure: ROBOTIC ASSISTED REPAIR OF BILATERAL INGUINAL HERNIA WITH MESH (Bilateral: Groin) Diagnosis: (BILATERAL INGUINAL HERNIA)    Surgeons: Cosmo Cornell MD Anesthesiologist: Tang Grajeda MD    Anesthesia Type: general ASA Status: 3            Anesthesia Type: general    Vitals Value Taken Time   /79 12/11/23 1700   Temp 97 12/11/23 1704   Pulse 79 12/11/23 1703   Resp 17 12/11/23 1703   SpO2 95 % 12/11/23 1703   Vitals shown include unfiled device data. Patient Location: PACU    Anesthesia Type: general    Airway Patency: extubated    Postop Pain Control: adequate    Mental Status: mildly sedated but able to meaningfully participate in the post-anesthesia evaluation    Nausea/Vomiting: none    Cardiopulmonary/Hydration status: stable euvolemic    Complications: no apparent anesthesia related complications    Postop vital signs: stable    Dental Exam: Unchanged from Preop    Patient to be discharged from PACU when criteria met.

## 2023-12-11 NOTE — H&P
HPI:    Neal Grant is a 70year old male who presents for evaluation of a bilateral inguinal hernia. Patient describes a bulge and mild discomfort especially with physical activity. Patient presented to the [facility] on 2023 and underwent a CT scan. This revealed bilateral fat-containing inguinal hernias. His right side was symptomatic. Patient continues to have some mild burning sensation in the right groin. No nausea or vomiting. Prior Hernia Surgery: Previous left inguinal hernia repair with mesh    Past Medical History:  Diagnosis Date  Arrhythmia  Atrial fibrillation ([address]  CAD (coronary artery disease)  anterior STEMI, s/p DARRICK to LAD.  Left ventriculogram demonstrated a LV ejection fraction of 25-30%  Childhood asthma  Essential hypertension, benign  GERD  Hearing impairment  bilateral hearing aids  HSV-2 (herpes simplex virus 2) infection  HYPERLIPIDEMIA  HYPOTHYROIDISM  Neuropathy  feet  NSVT (nonsustained ventricular tachycardia) (HCC)  Obstructive sleep apnea (adult) (pediatric) 2019  DMG SPLIT AHI 37 SaO2 katalina 85 % autoPAP 6-16 Premier  PONV (postoperative nausea and vomiting)  ST elevation myocardial infarction (STEMI), unspecified artery ([address] 2020  Thyroid disease  Type II or unspecified type diabetes mellitus without mention of complication, not stated as uncontrolled -  Visual impairment  glasses    Past Surgical History:  Procedure Laterality Date  CARDIOVERSION  CATARACT 2019  CATARACT 2019  COLONOSCOPY N/A 2018  Procedure: COLONOSCOPY, POSSIBLE BIOPSY, POSSIBLE POLYPECTOMY 16177; Surgeon: Osvaldo Sawyer MD; Location: [address] [place] [address] 8/3/2010  Performed by Sumit Dean at [address] [facility] 8/3/2010  Screenin small polyps (adenoma and hyperplastic), mild pan-diverticulosis, int hemorrhoids; next colonoscopy in ; Performed by Sumit Dean at [place] [address] [facility]  HERNIA SURGERY  Left inguinal hernia  OTHER SURGICAL HISTORY  Ganglion cyst removal right wrist  REPAIR ING HERNIA,5+Y/O,REDUCIBL  SINUS SURGERY Bilateral 06/28/2018  FESS/turbinate surgery by Dr Jg Irby  TONSILLECTOMY  UPPER GI ENDOSCOPY,DIAGNOSIS 8/3/2010  EGD (RUQ pain, wt loss): mild esophagitis, otherwise normal; Performed by Sumit Dean at [facility]    Current Outpatient Medications  Medication Sig Dispense Refill  Fluticasone-Salmeterol 100-50 MCG/ACT Inhalation Aerosol Powder, Breath Activated Inhale 1 puff into the lungs daily. glimepiride 2 MG Oral Tab Take 1 tablet by mouth every morning. fluticasone propionate 50 MCG/ACT Nasal Suspension 2 sprays by Each Nare route daily. 16 g 3  ZOLPIDEM 5 MG Oral Tab TAKE ONE TABLET BY MOUTH AT BEDTIME AS NEEDED FOR SLEEP 30 tablet 0  ALPRAZolam 0.25 MG Oral Tab Take 1 tablet (0.25 mg total) by mouth daily as needed. 30 tablet 0  albuterol (PROAIR HFA) 108 (90 Base) MCG/ACT Inhalation Aero Soln Inhale 2 puffs into the lungs every 6 (six) hours as needed for Wheezing. 18 g 3  insulin glargine (LANTUS SOLOSTAR) 100 UNIT/ML Subcutaneous Solution Pen-injector Inject 18 Units into the skin nightly. 15 mL 1  cyclobenzaprine 10 MG Oral Tab Take 1 tablet (10 mg total) by mouth 3 (three) times daily as needed for Muscle spasms. 20 tablet 1  rosuvastatin 10 MG Oral Tab Take 1 tablet (10 mg total) by mouth nightly. take at bedtime 90 tablet 1  rivaroxaban (XARELTO) 20 MG Oral Tab Take 1 tablet (20 mg total) by mouth daily. 90 tablet 1  metoprolol succinate ER 50 MG Oral Tablet 24 Hr Take 1 tablet (50 mg total) by mouth daily. 90 tablet 3  sacubitril-valsartan (ENTRESTO) 24-26 MG Oral Tab Take 1 tablet by mouth 2 (two) times daily. 180 tablet 1  clopidogrel (PLAVIX) 75 MG Oral Tab Take 1 tablet (75 mg total) by mouth nightly. 90 tablet 3  furosemide 20 MG Oral Tab Take 1 tablet (20 mg total) by mouth daily.  90 tablet 3  Glucose Blood (CONTOUR NEXT TEST) In Vitro Strip Use to test blood sugars daily 100 each 1  Blood Glucose Monitoring Suppl (CONTOUR NEXT MONITOR) w/Device Does not apply Kit 1 kit daily. Use to test blood sugars daily. 1 kit 0  metFORMIN 500 MG Oral Tab Take 1 tablet (500 mg total) by mouth 3 (three) times daily with meals. 270 tablet 1  levothyroxine 100 MCG Oral Tab Take 1 tablet (100 mcg total) by mouth daily. 90 tablet 3  gabapentin 300 MG Oral Cap Take 1 capsule (300 mg total) by mouth nightly. Start 1 capsule each night by mouth, may increase each week up to 1 capsule three times a day 90 capsule 3  cetirizine 10 MG Oral Tab Take 10 mg by mouth daily. Shea Butter Tightness in Chest  Atorvastatin MYALGIA  Ampicillin RASH  Januvia [Sitaglipti* OTHER (SEE COMMENTS)  Comment:Tachycardia  Naproxen OTHER (SEE COMMENTS)  Comment:Migraine, right arm numbness, weakness, syncope  Jardiance [Empaglif* ITCHING, OTHER (SEE COMMENTS)  Comment:Stomach and lower back pain. Male Yeast infection.   Latex RASH  Penicillins RASH    Family History  Problem Relation Age of Onset  No Known Problems Mother  Heart Disease Father    Social History  Tobacco Use  Smoking status: Former  Packs/day: 0.07  Years: 10.00  Additional pack years: 0.00  Total pack years: 0.70  Types: Cigarettes  Start date: 1/15/1995  Quit date: 2005  Years since quittin.9  Smokeless tobacco: Never  Tobacco comments: socially  Vaping Use  Vaping Use: Never used  Alcohol use: Not Currently  Alcohol/week: 2.0 - 3.0 standard drinks of alcohol  Types: 2 - 3 Cans of beer per week  Drug use: No    ROS:    10 point review performed with pertinent positives and negatives per history of present illness    EXAM:    GENERAL: well developed, well nourished male, in no apparent distress  SKIN: anicteric  HEENT: normocephalic, sclera anicteric  NECK: supple, no JVD  RESPIRATORY: clear to auscultation  CARDIOVASCULAR: RRR  ABDOMEN: normal active BS, soft and no tenderness, no mass, no umbilical hernia  INGUINAL: bilateral inguinal hernia, reducible and mild tenderness, right inguinal hernia larger than left  SCROTUM: no testicular mass  LYMPHATIC: no lymphadenopathy  EXTREMITIES: no cyanosis or edema    IMPRESSION/PLAN    1. Bilateral Inguinal Hernia: Robotic assisted bilateral Inguinal Hernia Repair with Mesh    Risks of surgery were discussed in detail including but not limited to infection, bleeding, recurrent hernia,conversion to an open procedure, shoulder pain, DVT, PE and MI. We also discussed the possibility of contralateral hernia repair if discovered incidentally at the time of surgery. Hernia surgery booklet given to patient and questions answered. 2. Previous left inguinal hernia repair    3. Coronary artery disease currently on Xarelto/Plavix    4. Atrial fibrillation    5.  Diabetes

## 2023-12-11 NOTE — ANESTHESIA PROCEDURE NOTES
Airway  Date/Time: 12/11/2023 3:45 PM  Urgency: elective    Airway not difficult    General Information and Staff    Patient location during procedure: OR  Anesthesiologist: Sujit Whyte MD  Performed: anesthesiologist   Performed by: Sujit Whyte MD  Authorized by:  Sujit Whyte MD      Indications and Patient Condition  Indications for airway management: anesthesia  Spontaneous ventilation: present  Sedation level: deep  Preoxygenated: yes  Patient position: sniffing  Mask difficulty assessment: 1 - vent by mask    Final Airway Details  Final airway type: endotracheal airway      Successful airway: ETT  Cuffed: yes   Successful intubation technique: Video laryngoscopy  Facilitating devices/methods: intubating stylet  Endotracheal tube insertion site: oral  Blade: Corona  Blade size: #3  ETT size (mm): 7.0    Cormack-Lehane Classification: grade IIA - partial view of glottis  Placement verified by: capnometry   Cuff volume (mL): 8  Measured from: teeth  ETT to teeth (cm): 24  Number of attempts at approach: 1  Ventilation between attempts: none  Number of other approaches attempted: 0

## 2024-04-03 ENCOUNTER — APPOINTMENT (OUTPATIENT)
Dept: GENERAL RADIOLOGY | Facility: HOSPITAL | Age: 64
End: 2024-04-03
Attending: EMERGENCY MEDICINE
Payer: MEDICARE

## 2024-04-03 ENCOUNTER — HOSPITAL ENCOUNTER (EMERGENCY)
Facility: HOSPITAL | Age: 64
Discharge: HOME | End: 2024-04-03
Attending: EMERGENCY MEDICINE
Payer: MEDICARE

## 2024-04-03 VITALS
WEIGHT: 207 LBS | DIASTOLIC BLOOD PRESSURE: 99 MMHG | SYSTOLIC BLOOD PRESSURE: 147 MMHG | HEIGHT: 72 IN | OXYGEN SATURATION: 100 % | HEART RATE: 59 BPM | TEMPERATURE: 97 F | RESPIRATION RATE: 13 BRPM | BODY MASS INDEX: 28.04 KG/M2

## 2024-04-03 DIAGNOSIS — R06.09 DYSPNEA ON EXERTION: ICD-10-CM

## 2024-04-03 DIAGNOSIS — R20.2 NUMBNESS AND TINGLING: Primary | ICD-10-CM

## 2024-04-03 DIAGNOSIS — R20.0 NUMBNESS AND TINGLING: Primary | ICD-10-CM

## 2024-04-03 LAB
ALBUMIN SERPL-MCNC: 3.9 G/DL (ref 3.4–5)
ALBUMIN/GLOB SERPL: 1.2 {RATIO} (ref 1–2)
ALP LIVER SERPL-CCNC: 72 U/L
ALT SERPL-CCNC: 30 U/L
ANION GAP SERPL CALC-SCNC: 7 MMOL/L (ref 0–18)
ARTERIAL PATENCY WRIST A: POSITIVE
AST SERPL-CCNC: 21 U/L (ref 15–37)
ATRIAL RATE: 56 BPM
ATRIAL RATE: 58 BPM
BASE EXCESS BLDA CALC-SCNC: 2.4 MMOL/L (ref ?–2)
BASOPHILS # BLD AUTO: 0.06 X10(3) UL (ref 0–0.2)
BASOPHILS NFR BLD AUTO: 0.9 %
BILIRUB SERPL-MCNC: 0.5 MG/DL (ref 0.1–2)
BODY TEMPERATURE: 98.6 F
BUN BLD-MCNC: 12 MG/DL (ref 9–23)
CA-I BLD-SCNC: 1.2 MMOL/L (ref 0.95–1.32)
CALCIUM BLD-MCNC: 8.9 MG/DL (ref 8.5–10.1)
CHLORIDE SERPL-SCNC: 108 MMOL/L (ref 98–112)
CO2 SERPL-SCNC: 23 MMOL/L (ref 21–32)
COHGB MFR BLD: 1.4 % SAT (ref 0–3)
CREAT BLD-MCNC: 1.26 MG/DL
EGFRCR SERPLBLD CKD-EPI 2021: 63 ML/MIN/1.73M2 (ref 60–?)
EOSINOPHIL # BLD AUTO: 0.35 X10(3) UL (ref 0–0.7)
EOSINOPHIL NFR BLD AUTO: 5.4 %
ERYTHROCYTE [DISTWIDTH] IN BLOOD BY AUTOMATED COUNT: 12 %
GLOBULIN PLAS-MCNC: 3.3 G/DL (ref 2.8–4.4)
GLUCOSE BLD-MCNC: 150 MG/DL (ref 70–99)
HCO3 BLDA-SCNC: 26.7 MEQ/L (ref 21–27)
HCT VFR BLD AUTO: 43.3 %
HGB BLD-MCNC: 15.1 G/DL
HGB BLD-MCNC: 15.2 G/DL
IMM GRANULOCYTES # BLD AUTO: 0.02 X10(3) UL (ref 0–1)
IMM GRANULOCYTES NFR BLD: 0.3 %
LACTATE BLD-SCNC: 0.9 MMOL/L (ref 0.5–2)
LYMPHOCYTES # BLD AUTO: 1.95 X10(3) UL (ref 1–4)
LYMPHOCYTES NFR BLD AUTO: 29.8 %
MCH RBC QN AUTO: 31.7 PG (ref 26–34)
MCHC RBC AUTO-ENTMCNC: 34.9 G/DL (ref 31–37)
MCV RBC AUTO: 90.8 FL
METHGB MFR BLD: 1 % SAT (ref 0.4–1.5)
MONOCYTES # BLD AUTO: 0.53 X10(3) UL (ref 0.1–1)
MONOCYTES NFR BLD AUTO: 8.1 %
NEUTROPHILS # BLD AUTO: 3.63 X10 (3) UL (ref 1.5–7.7)
NEUTROPHILS # BLD AUTO: 3.63 X10(3) UL (ref 1.5–7.7)
NEUTROPHILS NFR BLD AUTO: 55.5 %
NT-PROBNP SERPL-MCNC: 75 PG/ML (ref ?–125)
OSMOLALITY SERPL CALC.SUM OF ELEC: 289 MOSM/KG (ref 275–295)
OXYHGB MFR BLDA: 95.4 % (ref 92–100)
P AXIS: 11 DEGREES
P AXIS: 41 DEGREES
P-R INTERVAL: 188 MS
P-R INTERVAL: 188 MS
PCO2 BLDA: 38 MM HG (ref 35–45)
PH BLDA: 7.45 [PH] (ref 7.35–7.45)
PLATELET # BLD AUTO: 235 10(3)UL (ref 150–450)
PO2 BLDA: 80 MM HG (ref 80–100)
POTASSIUM BLD-SCNC: 4 MMOL/L (ref 3.6–5.1)
POTASSIUM SERPL-SCNC: 4.2 MMOL/L (ref 3.5–5.1)
PROT SERPL-MCNC: 7.2 G/DL (ref 6.4–8.2)
Q-T INTERVAL: 422 MS
Q-T INTERVAL: 434 MS
QRS DURATION: 102 MS
QRS DURATION: 94 MS
QTC CALCULATION (BEZET): 407 MS
QTC CALCULATION (BEZET): 426 MS
R AXIS: -29 DEGREES
R AXIS: -33 DEGREES
RBC # BLD AUTO: 4.77 X10(6)UL
SODIUM BLD-SCNC: 136 MMOL/L (ref 135–145)
SODIUM SERPL-SCNC: 138 MMOL/L (ref 136–145)
T AXIS: -4 DEGREES
T AXIS: 19 DEGREES
TROPONIN I SERPL HS-MCNC: 4 NG/L
TROPONIN I SERPL HS-MCNC: <3 NG/L
VENTRICULAR RATE: 56 BPM
VENTRICULAR RATE: 58 BPM
WBC # BLD AUTO: 6.5 X10(3) UL (ref 4–11)

## 2024-04-03 PROCEDURE — 85025 COMPLETE CBC W/AUTO DIFF WBC: CPT | Performed by: EMERGENCY MEDICINE

## 2024-04-03 PROCEDURE — 71045 X-RAY EXAM CHEST 1 VIEW: CPT | Performed by: EMERGENCY MEDICINE

## 2024-04-03 PROCEDURE — 84132 ASSAY OF SERUM POTASSIUM: CPT | Performed by: EMERGENCY MEDICINE

## 2024-04-03 PROCEDURE — 99284 EMERGENCY DEPT VISIT MOD MDM: CPT

## 2024-04-03 PROCEDURE — 85018 HEMOGLOBIN: CPT | Performed by: EMERGENCY MEDICINE

## 2024-04-03 PROCEDURE — 82330 ASSAY OF CALCIUM: CPT | Performed by: EMERGENCY MEDICINE

## 2024-04-03 PROCEDURE — 83050 HGB METHEMOGLOBIN QUAN: CPT | Performed by: EMERGENCY MEDICINE

## 2024-04-03 PROCEDURE — 93010 ELECTROCARDIOGRAM REPORT: CPT

## 2024-04-03 PROCEDURE — 84295 ASSAY OF SERUM SODIUM: CPT | Performed by: EMERGENCY MEDICINE

## 2024-04-03 PROCEDURE — 83605 ASSAY OF LACTIC ACID: CPT | Performed by: EMERGENCY MEDICINE

## 2024-04-03 PROCEDURE — 99285 EMERGENCY DEPT VISIT HI MDM: CPT

## 2024-04-03 PROCEDURE — 80053 COMPREHEN METABOLIC PANEL: CPT | Performed by: EMERGENCY MEDICINE

## 2024-04-03 PROCEDURE — 36600 WITHDRAWAL OF ARTERIAL BLOOD: CPT | Performed by: EMERGENCY MEDICINE

## 2024-04-03 PROCEDURE — 36415 COLL VENOUS BLD VENIPUNCTURE: CPT

## 2024-04-03 PROCEDURE — 82375 ASSAY CARBOXYHB QUANT: CPT | Performed by: EMERGENCY MEDICINE

## 2024-04-03 PROCEDURE — 93005 ELECTROCARDIOGRAM TRACING: CPT

## 2024-04-03 PROCEDURE — 84484 ASSAY OF TROPONIN QUANT: CPT | Performed by: EMERGENCY MEDICINE

## 2024-04-03 PROCEDURE — 83880 ASSAY OF NATRIURETIC PEPTIDE: CPT | Performed by: EMERGENCY MEDICINE

## 2024-04-03 PROCEDURE — 82803 BLOOD GASES ANY COMBINATION: CPT | Performed by: EMERGENCY MEDICINE

## 2024-04-03 RX ORDER — LORAZEPAM 1 MG/1
1 TABLET ORAL ONCE
Status: COMPLETED | OUTPATIENT
Start: 2024-04-03 | End: 2024-04-03

## 2024-04-03 RX ORDER — HYDROXYZINE PAMOATE 25 MG/1
25 CAPSULE ORAL 3 TIMES DAILY PRN
Qty: 9 CAPSULE | Refills: 0 | Status: SHIPPED | OUTPATIENT
Start: 2024-04-03 | End: 2024-05-03

## 2024-04-03 NOTE — ED INITIAL ASSESSMENT (HPI)
Patient had a sleep study last night in [place], left at 0400. Prior to leaving patient had a cold feeling in right shoulder, right hand/fingers, and right toes. Patient took a shower at sleep study then felt better so he went home. Had elevated glucose (198 mg/dl) at home which is very abnormal for him. Currently reports bilateral foot numbness which is chronic and reports feeling \"queasy\". Normally takes metoprolol in afternoon but took extra dose this morning because BP was 134/84. Patient currently denies dizziness.

## 2024-04-03 NOTE — ED PROVIDER NOTES
Patient Seen in: [facility] Emergency Department      History     Chief Complaint   Patient presents with    Dizziness     Stated Complaint: dizziness, htn    Subjective:   HPI    63-year-old male presents to the emergency department with complaints of dizziness and dyspnea on exertion.  Patient has had issues with dyspnea on exertion for over 2 years.  He had an extensive workup done by cardiology that included an angiogram as well as Holter monitors.  He was cleared from a cardiac standpoint he did have a stent placed in the past he has been compliant all of his medications and he states he is not having any chest pain.  He is in the process of being evaluated by pulmonary.  He underwent a sleep study yesterday.  He states that really in the morning during his sleep study they had increased the oxygen and then he felt a cold tingling feeling on the right side of his body.  He then went home and took a warm shower and it resolved.  He states that he forgot to take his medications when he first came home and then had recalled and found that his blood pressure was elevated.  He took his morning medications but has been having some intermittent tingling sensations.  He states that his blood pressure was up and his blood sugar was more elevated than usual and all of this is not typical for him and therefore he came in for further evaluation.  He does not have any distinct chest pain at this time.    Objective:   Past Medical History:   Diagnosis Date    Arrhythmia     afib    Atrial fibrillation (HCC)     CAD (coronary artery disease)     anterior STEMI, s/p DARRICK to LAD. Left ventriculogram demonstrated a LV ejection fraction of 25-30%     Cardiomyopathy (HCC)     Childhood asthma (HCC)     Congestive heart disease (HCC)     Disorder of thyroid     Essential hypertension, benign     GERD     Hearing impairment     bilateral hearing aids    Heart attack (HCC)     High blood pressure     High cholesterol     HSV-2  (herpes simplex virus 2) infection     HYPERLIPIDEMIA     HYPOTHYROIDISM     Neuropathy     feet    NSVT (nonsustained ventricular tachycardia) (HCC)     Obstructive sleep apnea (adult) (pediatric) 2019    DM SPLIT AHI 37 SaO2 katalina 85 % autoPAP 6-16 Premier    PONV (postoperative nausea and vomiting)     Sleep apnea     no tx    ST elevation myocardial infarction (STEMI), unspecified artery (HCC) 2020    Thyroid disease     Type II or unspecified type diabetes mellitus without mention of complication, not stated as uncontrolled -    Visual impairment     glasses              Past Surgical History:   Procedure Laterality Date    CARDIOVERSION      CATARACT  2019    CATARACT  2019    COLONOSCOPY N/A 2018    Procedure: COLONOSCOPY, POSSIBLE BIOPSY, POSSIBLE POLYPECTOMY 26927;  Surgeon: Blair Roberts MD;  Location: [facility], [facility]    COLONOSCOPY,BIOPSY  8/3/2010    Performed by DARBY BANEGAS at [facility], [facility]    COLONOSCOPY,REMV LESN,SNARE  8/3/2010    Screenin small polyps (adenoma and hyperplastic), mild pan-diverticulosis, int hemorrhoids; next colonoscopy in ; Performed by DARBY BANEGAS at [facility]    HERNIA SURGERY      Left inguinal hernia    OTHER SURGICAL HISTORY      Ganglion cyst removal right wrist    REPAIR ING HERNIA,5+Y/O,REDUCIBL      SINUS SURGERY   Bilateral 2018    FESS/turbinate surgery by Dr NIKKI Mckeon    TONSILLECTOMY      UPPER GI ENDOSCOPY,DIAGNOSIS  8/3/2010    EGD (RUQ pain, wt loss): mild esophagitis, otherwise normal; Performed by DARBY BAENGAS at [facility], [facility]                Social History     Socioeconomic History    Marital status:     Number of children: 1   Tobacco Use    Smoking status: Former     Packs/day: 0.07     Years: 10.00     Additional pack years: 0.00     Total pack years: 0.70     Types: Cigarettes     Start date: 1/15/1995     Quit date: 2005     Years since quitting:  19.2    Smokeless tobacco: Never    Tobacco comments:     socially   Vaping Use    Vaping Use: Never used   Substance and Sexual Activity    Alcohol use: Not Currently     Alcohol/week: 2.0 - 3.0 standard drinks of alcohol     Types: 2 - 3 Cans of beer per week    Drug use: No    Sexual activity: Never     Partners: Female     Comment:      Social Determinants of Health     Physical Activity: Insufficiently Active (1/24/2022)    Exercise Vital Sign     Days of Exercise per Week: 2 days     Minutes of Exercise per Session: 10 min              Review of Systems   All other systems reviewed and are negative.      Positive for stated complaint: dizziness, htn  Other systems are as noted in HPI.  Constitutional and vital signs reviewed.      All other systems reviewed and negative except as noted above.    Physical Exam     ED Triage Vitals [04/03/24 1138]   BP (!) 189/115   Pulse 67   Resp 16   Temp 97.3 °F (36.3 °C)   Temp src    SpO2 97 %   O2 Device None (Room air)       Current:BP (!) 147/99   Pulse 59   Temp 97.3 °F (36.3 °C)   Resp 13   Ht 182.9 cm (6')   Wt 93.9 kg   SpO2 100%   BMI 28.07 kg/m²         Physical Exam  Vitals and nursing note reviewed.   Constitutional:       General: He is not in acute distress.     Appearance: Normal appearance. He is well-developed.   HENT:      Head: Normocephalic and atraumatic.   Cardiovascular:      Rate and Rhythm: Normal rate and regular rhythm.      Pulses: Normal pulses.      Heart sounds: Normal heart sounds.   Pulmonary:      Effort: Pulmonary effort is normal.      Breath sounds: Normal breath sounds. No stridor.   Abdominal:      General: Bowel sounds are normal.      Palpations: Abdomen is soft.   Musculoskeletal:         General: Normal range of motion.      Cervical back: Normal range of motion and neck supple.   Lymphadenopathy:      Cervical: No cervical adenopathy.   Skin:     General: Skin is warm and dry.      Capillary Refill: Capillary refill  takes less than 2 seconds.   Neurological:      General: No focal deficit present.      Mental Status: He is alert and oriented to person, place, and time.   Psychiatric:         Mood and Affect: Mood is anxious.              ED Course     Labs Reviewed   COMP METABOLIC PANEL (14) - Abnormal; Notable for the following components:       Result Value    Glucose 150 (*)     All other components within normal limits   ABG PANEL W ELECT AND LACTATE - Abnormal; Notable for the following components:    ABG Base Excess 2.4 (*)     All other components within normal limits   PRO BETA NATRIURETIC PEPTIDE - Normal   TROPONIN I HIGH SENSITIVITY - Normal   TROPONIN I HIGH SENSITIVITY - Normal   CBC WITH DIFFERENTIAL WITH PLATELET    Narrative:     The following orders were created for panel order CBC With Differential With Platelet.  Procedure                               Abnormality         Status                     ---------                               -----------         ------                     CBC W/ DIFFERENTIAL[[id number]]                              Final result                 Please view results for these tests on the individual orders.   RAINBOW DRAW LAVENDER   RAINBOW DRAW LIGHT GREEN   RAINBOW DRAW BLUE   CBC W/ DIFFERENTIAL     EKG    Rate, intervals and axes as noted on EKG Report.  Rate: 58  Rhythm: Sinus Rhythm  Reading: No acute ST segment elevation, no interval change from EKG from 2021         Repeat EKG  EKG    Rate, intervals and axes as noted on EKG Report.  Rate: 56  Rhythm: Sinus Rhythm  Reading: No interval change from previous EKG            XR CHEST AP PORTABLE  (CPT=71045)    Result Date: 4/3/2024  PROCEDURE:  XR CHEST AP PORTABLE  (CPT=71045)  TECHNIQUE:  AP chest radiograph was obtained.  COMPARISON:  ELEUTERIO , KALEB, XR CHEST AP PORTABLE  (CPT=71045), 7/12/2021, 3:39 PM.  INDICATIONS:  dizziness, htn  PATIENT STATED HISTORY: (As transcribed by Technologist)  Patient adds he was at a sleep study  and started to experience tingling and numbess in limbs and high blood pressure.    FINDINGS:  Heart size and vascularity are normal.  Lung fields are clear.  No diaphragmatic or pleural abnormality.  The vera and mediastinum appear within normal limits.  Mild degenerative changes are seen in the spine.            CONCLUSION:  No acute cardiopulmonary abnormality identified.   LOCATION:  [facility]      Dictated by (CST): Carlos Eduardo Moya MD on 4/03/2024 at 1:23 PM     Finalized by (CST): Carlos Eduardo Moya MD on 4/03/2024 at 1:23 PM              MDM      Patient had IV established and blood work obtained.  He was placed on a monitor.  He was saying that he was feeling some shortness of breath but his O2 saturations were good we did a blood gas on room air and his pO2 was 80.  He is currently on anticoagulants.  He states he is compliant with them.  He had negative cardiac enzymes x 2.  He had no interval changes of his EKG.  Overall his blood work was unremarkable.  I spoke with his cardiologist and we are in agreement that this seems more consistent with potentially more hyperventilation or a stress response.  He received a dose of Ativan and this seemed to help him quite a bit.  I reviewed his findings with him and at this time I am not finding any acute abnormality that would require admission to the hospital.  Patient himself does not state that he has been feeling particularly stressed but we discussed this as a possible etiology.  He is having intermittent paresthesias and will also be referred to neurology is needed.  At this time he is not acutely hypoxic or require any further interventions in this regard.  He had a chest x-ray that personally reviewed that there is no evidence of pneumothorax pleural effusion or consolidation.  Reviewed radiology report they did not note any abnormalities either.  Patient was subsequently discharged in good condition                                   Medical Decision  Making      Disposition and Plan     Clinical Impression:  1. Numbness and tingling    2. Dyspnea on exertion         Disposition:  Discharge  4/3/2024  3:49 pm    Follow-up:  [facility], [facility]  [address] [facility] [address]  [facility] [address] [phone]  Call  choose option 1 for general neurology    Chris Taylor MD  [address]  [place] [address] [phone]    Schedule an appointment as soon as possible for a visit      Junior Cha MD  [address]  [facility] [address]  [place] [address] [phone]    Follow up            Medications Prescribed:  Discharge Medication List as of 4/3/2024  3:50 PM        START taking these medications    Details   hydrOXYzine Pamoate 25 MG Oral Cap Take 1 capsule (25 mg total) by mouth 3 (three) times daily as needed for Itching (numbness or tingling)., Normal, Disp-9 capsule, R-0

## (undated) DEVICE — ROBOTIC GENERAL: Brand: MEDLINE INDUSTRIES, INC.

## (undated) DEVICE — COLUMN DRAPE

## (undated) DEVICE — ADHESIVE SKIN TOP FOR WND CLSR DERMBND ADV

## (undated) DEVICE — TUBING 1895522 5PK STRAIGHTSHOT TO XPS: Brand: STRAIGHTSHOT®

## (undated) DEVICE — CANNULA SEAL

## (undated) DEVICE — SUTURE CHROMIC GUT 4-0 P-3

## (undated) DEVICE — UNDYED BRAIDED (POLYGLACTIN 910), SYNTHETIC ABSORBABLE SUTURE: Brand: COATED VICRYL

## (undated) DEVICE — MEGA SUTURECUT ND: Brand: ENDOWRIST

## (undated) DEVICE — 40580 - THE PINK PAD - ADVANCED TRENDELENBURG POSITIONING KIT: Brand: 40580 - THE PINK PAD - ADVANCED TRENDELENBURG POSITIONING KIT

## (undated) DEVICE — Device: Brand: NAKAO QUICKTRAP

## (undated) DEVICE — ABSORBABLE WOUND CLOSURE DEVICE: Brand: V-LOC 90

## (undated) DEVICE — INSTRUMENT TRACKER 9733533XOM ENT 1PK

## (undated) DEVICE — POSITIONER OR 9X8X4.5IN NLTX

## (undated) DEVICE — STERILE DRAPE FOR USE WITH SITUATE ROOM SCANNER: Brand: SITUATE

## (undated) DEVICE — BLADE 1884080EM TRICUT 4MMX13CM M4 ROHS: Brand: FUSION®

## (undated) DEVICE — SUTURE PLAIN GUT 4-0 SC-1

## (undated) DEVICE — MONOPOLAR CURVED SCISSORS: Brand: ENDOWRIST

## (undated) DEVICE — SUTURE PROLENE 2-0 CT-2

## (undated) DEVICE — ENDOPATH ULTRA VERESS INSUFFLATION NEEDLES WITH LUER LOCK CONNECTORS: Brand: ENDOPATH

## (undated) DEVICE — STERILE POLYISOPRENE POWDER-FREE SURGICAL GLOVES: Brand: PROTEXIS

## (undated) DEVICE — BLADE 1884006EM RAD40 4MM M4 ROTATE ROHS: Brand: FUSION®

## (undated) DEVICE — ARM DRAPE

## (undated) DEVICE — LAPAROVUE VISIBILITY SYSTEM LAPAROSCOPIC SOLUTIONS: Brand: LAPAROVUE

## (undated) DEVICE — GLOVE SURG SENSICARE SZ 6-1/2

## (undated) DEVICE — PROGRASP FORCEPS: Brand: ENDOWRIST

## (undated) DEVICE — PATIENT TRACKER 9734887XOM NON-INVASIVE

## (undated) DEVICE — LIGHT HANDLE

## (undated) DEVICE — BLADE 1882040 5PK INFERIOR TURB 2MM

## (undated) DEVICE — SOL  .9 1000ML BTL

## (undated) DEVICE — DRAPE,TOP,102X53,STERILE: Brand: MEDLINE

## (undated) DEVICE — TRAY CATH 16FR F INCL BARDX IC COMPLT CARE

## (undated) DEVICE — TIP COVER ACCESSORY

## (undated) DEVICE — SINUS CDS: Brand: MEDLINE INDUSTRIES, INC.

## (undated) DEVICE — SINU FOAM: Brand: SINU-FOAM

## (undated) DEVICE — BLADELESS OBTURATOR: Brand: WECK VISTA

## (undated) DEVICE — BLADE 1884004 TRICUT 5PK 4MM: Brand: TRICUT®

## (undated) DEVICE — GLOVE SURG SENSICARE SZ 7

## (undated) DEVICE — KENDALL SCD EXPRESS SLEEVES, KNEE LENGTH, MEDIUM: Brand: KENDALL SCD

## (undated) NOTE — LETTER
OUTSIDE TESTING RESULT REQUEST     IMPORTANT: FOR YOUR IMMEDIATE ATTENTION  Please FAX all test results listed below to: [phone]         * * * * If testing is NOT complete, arrange with patient A.S.A.P. * * * *      Patient Name: Andrew Washburn  Surgery Date: 2023  Medical Record: [id number]  CSN: [id number]  : 1960 - A: 70 y     Sex: male  Surgeon(s):  Denice Jay MD  Procedure: ROBOTIC ASSISTED REPAIR OF BILATERAL INGUINAL HERNIA WITH MESH POSSIBLE OPEN  Anesthesia Type: General     Surgeon: Denice Jay MD     The following Testing and Time Line are REQUIRED PER ANESTHESIA     EKG READ AND SIGNED WITHIN   90 days      Thank You,   Sent by: Elsa Humphrey

## (undated) NOTE — LETTER
July 27, 2020          To Whom It May Concern,            Mr. Ketty Andres was treated by [facility] at [facility] July 23rd to July 27th, 2020. He may return to work August 3rd with a 20 lb weight restriction.

## (undated) NOTE — LETTER
[facility] Department  Phone: [phone]  OUTSIDE TESTING RESULT REQUEST      TO:   Dr. Yamila Arana and staff            Today's Date: 5/22/18    FAX #: [phone]     IMPORTANT: FOR YOUR IMMEDIATE ATTENTION      Please FAX all test res

## (undated) NOTE — LETTER
Date & Time: 7/12/2021, 6:48 PM  Patient: Ruth Ann Gallardo  Encounter Provider(s):    Anita Taylor MD       To Whom It May Concern:    Crystal Rucker was seen and treated in our department on 7/12/2021. He may return to work 7/13/2021.     If you have any

## (undated) NOTE — LETTER
OUTSIDE TESTING RESULT REQUEST     IMPORTANT: FOR YOUR IMMEDIATE ATTENTION  Please FAX all test results listed below to: [phone]     Testing already done on or about: 23      * * * * If testing is NOT complete, arrange with patient A.S.A.P. * * * *      Patient Name: Keshav Avendaño  Surgery Date: 2023  Medical Record: [id number]  CSN: [id number]  : 1960 - A: 70 y     Sex: male  Surgeon(s):  Patricia Abad MD  Procedure: ROBOTIC ASSISTED REPAIR OF BILATERAL INGUINAL HERNIA WITH MESH POSSIBLE OPEN  Anesthesia Type: General     Surgeon: Patricia Abad MD     The following Testing and Time Line are REQUIRED PER ANESTHESIA     EKG READ AND SIGNED WITHIN   90 days      Thank Debbie Murray,   Sent by:HALEY ALANIS

## (undated) NOTE — LETTER
Last Revised 02/07/06  Obstructive Sleep Apnea Questionnaire    Clinical signs and symptoms suggesting the possibility of OPAL    1. Predisposing physical characteristics (positive with any of the following present)  ? BMI 35kg/m²  ?  Craniofacial abnormalit pauses which are frightening to the observer, patient regularly falls asleep within minutes after being left unstimulated) in which case they should be treated as though they have severe sleep apnea.     The sleep laboratory’s assessment (none, mild, modera Point Total for B           C. Requirement for postoperative opioids.                Opioid requirement             Points   None 0    Low dose oral opiod 1    High dose oral opioids, parenteral or neuraxial opiods 3      Point Total for C        Estimation